# Patient Record
Sex: FEMALE | Race: WHITE | Employment: FULL TIME | URBAN - NONMETROPOLITAN AREA
[De-identification: names, ages, dates, MRNs, and addresses within clinical notes are randomized per-mention and may not be internally consistent; named-entity substitution may affect disease eponyms.]

---

## 2017-03-13 DIAGNOSIS — E89.0 POSTABLATIVE HYPOTHYROIDISM: ICD-10-CM

## 2017-03-13 RX ORDER — LEVOTHYROXINE SODIUM 0.15 MG/1
TABLET ORAL
Qty: 90 TABLET | Refills: 1 | Status: SHIPPED | OUTPATIENT
Start: 2017-03-13 | End: 2017-06-27 | Stop reason: SDUPTHER

## 2017-06-27 DIAGNOSIS — E89.0 POSTABLATIVE HYPOTHYROIDISM: ICD-10-CM

## 2017-06-27 RX ORDER — LEVOTHYROXINE SODIUM 0.15 MG/1
TABLET ORAL
Qty: 90 TABLET | Refills: 1 | Status: SHIPPED | OUTPATIENT
Start: 2017-06-27

## 2019-01-22 ENCOUNTER — HOSPITAL ENCOUNTER (OUTPATIENT)
Dept: MAMMOGRAPHY | Age: 49
Discharge: HOME | End: 2019-01-22
Payer: COMMERCIAL

## 2019-01-22 DIAGNOSIS — Z12.39 BREAST CANCER SCREENING: ICD-10-CM

## 2019-01-22 PROCEDURE — 77067 SCR MAMMO BI INCL CAD: CPT

## 2019-12-07 ASSESSMENT — PAIN DESCRIPTION - ORIENTATION: ORIENTATION: LEFT

## 2019-12-07 ASSESSMENT — PAIN SCALES - GENERAL: PAINLEVEL_OUTOF10: 6

## 2019-12-07 ASSESSMENT — PAIN DESCRIPTION - FREQUENCY: FREQUENCY: CONTINUOUS

## 2019-12-07 ASSESSMENT — PAIN DESCRIPTION - LOCATION: LOCATION: FLANK

## 2019-12-07 ASSESSMENT — PAIN DESCRIPTION - DESCRIPTORS: DESCRIPTORS: ACHING

## 2019-12-07 ASSESSMENT — PAIN DESCRIPTION - PAIN TYPE: TYPE: ACUTE PAIN

## 2019-12-08 ENCOUNTER — HOSPITAL ENCOUNTER (EMERGENCY)
Age: 49
Discharge: HOME | End: 2019-12-08
Payer: COMMERCIAL

## 2019-12-08 ENCOUNTER — APPOINTMENT (OUTPATIENT)
Dept: CT IMAGING | Age: 49
End: 2019-12-08
Payer: COMMERCIAL

## 2019-12-08 VITALS
HEIGHT: 67 IN | WEIGHT: 140 LBS | TEMPERATURE: 97.3 F | HEART RATE: 67 BPM | RESPIRATION RATE: 14 BRPM | OXYGEN SATURATION: 98 % | DIASTOLIC BLOOD PRESSURE: 78 MMHG | BODY MASS INDEX: 21.97 KG/M2 | SYSTOLIC BLOOD PRESSURE: 136 MMHG

## 2019-12-08 DIAGNOSIS — N20.1 URETEROLITHIASIS: ICD-10-CM

## 2019-12-08 DIAGNOSIS — N39.0 URINARY TRACT INFECTION WITHOUT COMPLICATION: ICD-10-CM

## 2019-12-08 DIAGNOSIS — N81.4 UTERINE PROLAPSE: ICD-10-CM

## 2019-12-08 DIAGNOSIS — N20.0 KIDNEY STONE: Primary | ICD-10-CM

## 2019-12-08 LAB
ALBUMIN SERPL-MCNC: 4.1 G/DL (ref 3.5–5.1)
ALP BLD-CCNC: 66 U/L (ref 38–126)
ALT SERPL-CCNC: 16 U/L (ref 11–66)
ANION GAP SERPL CALCULATED.3IONS-SCNC: 9 MEQ/L (ref 8–16)
AST SERPL-CCNC: 24 U/L (ref 5–40)
BACTERIA: ABNORMAL /HPF
BASOPHILS # BLD: 1.4 %
BASOPHILS ABSOLUTE: 0.1 THOU/MM3 (ref 0–0.1)
BILIRUB SERPL-MCNC: 0.2 MG/DL (ref 0.3–1.2)
BILIRUBIN DIRECT: < 0.2 MG/DL (ref 0–0.3)
BILIRUBIN URINE: ABNORMAL
BLOOD, URINE: ABNORMAL
BUN BLDV-MCNC: 22 MG/DL (ref 7–22)
CALCIUM SERPL-MCNC: 10.1 MG/DL (ref 8.5–10.5)
CASTS 2: ABNORMAL /LPF
CASTS UA: ABNORMAL /LPF
CHARACTER, URINE: ABNORMAL
CHLORIDE BLD-SCNC: 109 MEQ/L (ref 98–111)
CO2: 24 MEQ/L (ref 23–33)
COLOR: ABNORMAL
CREAT SERPL-MCNC: 0.8 MG/DL (ref 0.4–1.2)
CRYSTALS, UA: ABNORMAL
EOSINOPHIL # BLD: 10.1 %
EOSINOPHILS ABSOLUTE: 0.8 THOU/MM3 (ref 0–0.4)
EPITHELIAL CELLS, UA: ABNORMAL /HPF
ERYTHROCYTE [DISTWIDTH] IN BLOOD BY AUTOMATED COUNT: 13.9 % (ref 11.5–14.5)
ERYTHROCYTE [DISTWIDTH] IN BLOOD BY AUTOMATED COUNT: 50.2 FL (ref 35–45)
GFR SERPL CREATININE-BSD FRML MDRD: 76 ML/MIN/1.73M2
GLUCOSE BLD-MCNC: 85 MG/DL (ref 70–108)
GLUCOSE URINE: ABNORMAL MG/DL
HCT VFR BLD CALC: 37.5 % (ref 37–47)
HEMOGLOBIN: 11.8 GM/DL (ref 12–16)
ICTOTEST: NEGATIVE
IMMATURE GRANS (ABS): 0.03 THOU/MM3 (ref 0–0.07)
IMMATURE GRANULOCYTES: 0.4 %
KETONES, URINE: ABNORMAL
LEUKOCYTE ESTERASE, URINE: ABNORMAL
LIPASE: 48.4 U/L (ref 5.6–51.3)
LYMPHOCYTES # BLD: 22.3 %
LYMPHOCYTES ABSOLUTE: 1.7 THOU/MM3 (ref 1–4.8)
MAGNESIUM: 1.9 MG/DL (ref 1.6–2.4)
MCH RBC QN AUTO: 31.5 PG (ref 26–33)
MCHC RBC AUTO-ENTMCNC: 31.5 GM/DL (ref 32.2–35.5)
MCV RBC AUTO: 100 FL (ref 81–99)
MISCELLANEOUS 2: ABNORMAL
MONOCYTES # BLD: 6.3 %
MONOCYTES ABSOLUTE: 0.5 THOU/MM3 (ref 0.4–1.3)
MUCUS: ABNORMAL
NITRITE, URINE: ABNORMAL
NUCLEATED RED BLOOD CELLS: 0 /100 WBC
OSMOLALITY CALCULATION: 285.7 MOSMOL/KG (ref 275–300)
PH UA: ABNORMAL (ref 5–9)
PLATELET # BLD: 201 THOU/MM3 (ref 130–400)
PMV BLD AUTO: 10.9 FL (ref 9.4–12.4)
POTASSIUM SERPL-SCNC: 4.2 MEQ/L (ref 3.5–5.2)
PROTEIN UA: ABNORMAL
RBC # BLD: 3.75 MILL/MM3 (ref 4.2–5.4)
RBC URINE: ABNORMAL /HPF
RENAL EPITHELIAL, UA: ABNORMAL
SEG NEUTROPHILS: 59.5 %
SEGMENTED NEUTROPHILS ABSOLUTE COUNT: 4.5 THOU/MM3 (ref 1.8–7.7)
SODIUM BLD-SCNC: 142 MEQ/L (ref 135–145)
SPECIFIC GRAVITY, URINE: ABNORMAL (ref 1–1.03)
TOTAL PROTEIN: 6.6 G/DL (ref 6.1–8)
UROBILINOGEN, URINE: ABNORMAL EU/DL (ref 0–1)
WBC # BLD: 7.6 THOU/MM3 (ref 4.8–10.8)
WBC UA: ABNORMAL /HPF
YEAST: ABNORMAL

## 2019-12-08 PROCEDURE — 36415 COLL VENOUS BLD VENIPUNCTURE: CPT

## 2019-12-08 PROCEDURE — 6360000002 HC RX W HCPCS: Performed by: NURSE PRACTITIONER

## 2019-12-08 PROCEDURE — 6360000004 HC RX CONTRAST MEDICATION: Performed by: NURSE PRACTITIONER

## 2019-12-08 PROCEDURE — 96374 THER/PROPH/DIAG INJ IV PUSH: CPT

## 2019-12-08 PROCEDURE — 83690 ASSAY OF LIPASE: CPT

## 2019-12-08 PROCEDURE — 85025 COMPLETE CBC W/AUTO DIFF WBC: CPT

## 2019-12-08 PROCEDURE — 99284 EMERGENCY DEPT VISIT MOD MDM: CPT

## 2019-12-08 PROCEDURE — 74177 CT ABD & PELVIS W/CONTRAST: CPT

## 2019-12-08 PROCEDURE — 2580000003 HC RX 258: Performed by: NURSE PRACTITIONER

## 2019-12-08 PROCEDURE — 80053 COMPREHEN METABOLIC PANEL: CPT

## 2019-12-08 PROCEDURE — 81001 URINALYSIS AUTO W/SCOPE: CPT

## 2019-12-08 PROCEDURE — 83735 ASSAY OF MAGNESIUM: CPT

## 2019-12-08 PROCEDURE — 82248 BILIRUBIN DIRECT: CPT

## 2019-12-08 RX ORDER — HYDROCODONE BITARTRATE AND ACETAMINOPHEN 5; 325 MG/1; MG/1
1 TABLET ORAL EVERY 6 HOURS PRN
Qty: 12 TABLET | Refills: 0 | Status: SHIPPED | OUTPATIENT
Start: 2019-12-08 | End: 2019-12-11

## 2019-12-08 RX ORDER — SULFAMETHOXAZOLE AND TRIMETHOPRIM 800; 160 MG/1; MG/1
1 TABLET ORAL 2 TIMES DAILY
Qty: 20 TABLET | Refills: 0 | Status: SHIPPED | OUTPATIENT
Start: 2019-12-08 | End: 2019-12-18

## 2019-12-08 RX ORDER — KETOROLAC TROMETHAMINE 30 MG/ML
30 INJECTION, SOLUTION INTRAMUSCULAR; INTRAVENOUS ONCE
Status: COMPLETED | OUTPATIENT
Start: 2019-12-08 | End: 2019-12-08

## 2019-12-08 RX ORDER — 0.9 % SODIUM CHLORIDE 0.9 %
1000 INTRAVENOUS SOLUTION INTRAVENOUS ONCE
Status: COMPLETED | OUTPATIENT
Start: 2019-12-08 | End: 2019-12-08

## 2019-12-08 RX ORDER — TAMSULOSIN HYDROCHLORIDE 0.4 MG/1
0.4 CAPSULE ORAL DAILY
Qty: 30 CAPSULE | Refills: 0 | Status: SHIPPED | OUTPATIENT
Start: 2019-12-08 | End: 2020-12-28

## 2019-12-08 RX ORDER — KETOROLAC TROMETHAMINE 10 MG/1
10 TABLET, FILM COATED ORAL EVERY 6 HOURS PRN
Qty: 20 TABLET | Refills: 0 | Status: SHIPPED | OUTPATIENT
Start: 2019-12-08 | End: 2020-12-28

## 2019-12-08 RX ORDER — ONDANSETRON 4 MG/1
4 TABLET, ORALLY DISINTEGRATING ORAL EVERY 8 HOURS PRN
Qty: 20 TABLET | Refills: 0 | Status: SHIPPED | OUTPATIENT
Start: 2019-12-08 | End: 2020-12-28

## 2019-12-08 RX ADMIN — SODIUM CHLORIDE 1000 ML: 9 INJECTION, SOLUTION INTRAVENOUS at 00:42

## 2019-12-08 RX ADMIN — IOPAMIDOL 85 ML: 755 INJECTION, SOLUTION INTRAVENOUS at 01:00

## 2019-12-08 RX ADMIN — KETOROLAC TROMETHAMINE 30 MG: 30 INJECTION, SOLUTION INTRAMUSCULAR at 02:17

## 2019-12-08 ASSESSMENT — PAIN DESCRIPTION - LOCATION: LOCATION: VAGINA

## 2019-12-08 ASSESSMENT — ENCOUNTER SYMPTOMS
SHORTNESS OF BREATH: 0
DIARRHEA: 0
WHEEZING: 0
CHEST TIGHTNESS: 0
COUGH: 0
NAUSEA: 0
TROUBLE SWALLOWING: 0
SORE THROAT: 0
ABDOMINAL PAIN: 1
CONSTIPATION: 0
RHINORRHEA: 0

## 2019-12-08 ASSESSMENT — PAIN SCALES - GENERAL: PAINLEVEL_OUTOF10: 4

## 2019-12-08 ASSESSMENT — PAIN DESCRIPTION - DESCRIPTORS: DESCRIPTORS: PRESSURE

## 2019-12-08 ASSESSMENT — PAIN DESCRIPTION - PAIN TYPE: TYPE: ACUTE PAIN

## 2020-10-12 ENCOUNTER — TELEPHONE (OUTPATIENT)
Dept: RHEUMATOLOGY | Age: 50
End: 2020-10-12

## 2020-10-12 NOTE — TELEPHONE ENCOUNTER
Patient said that her previous rheumatologist was retiring and referred her to Dr. Hernando Solitario. She said that she was told that they were sending a referral to the office.   Please advise  [phone]

## 2020-11-10 ENCOUNTER — HOSPITAL ENCOUNTER (OUTPATIENT)
Dept: MAMMOGRAPHY | Age: 50
Discharge: HOME | End: 2020-11-10
Payer: COMMERCIAL

## 2020-11-10 PROCEDURE — 77063 BREAST TOMOSYNTHESIS BI: CPT

## 2020-12-28 ENCOUNTER — HOSPITAL ENCOUNTER (OUTPATIENT)
Dept: GENERAL RADIOLOGY | Age: 50
Discharge: HOME | End: 2020-12-28
Payer: COMMERCIAL

## 2020-12-28 ENCOUNTER — HOSPITAL ENCOUNTER (OUTPATIENT)
Age: 50
Discharge: HOME | End: 2020-12-28
Payer: COMMERCIAL

## 2020-12-28 ENCOUNTER — NURSE ONLY (OUTPATIENT)
Dept: LAB | Age: 50
End: 2020-12-28

## 2020-12-28 ENCOUNTER — OFFICE VISIT (OUTPATIENT)
Dept: RHEUMATOLOGY | Age: 50
End: 2020-12-28
Payer: COMMERCIAL

## 2020-12-28 VITALS
WEIGHT: 147.2 LBS | DIASTOLIC BLOOD PRESSURE: 78 MMHG | OXYGEN SATURATION: 98 % | BODY MASS INDEX: 23.1 KG/M2 | HEIGHT: 67 IN | SYSTOLIC BLOOD PRESSURE: 120 MMHG | HEART RATE: 68 BPM

## 2020-12-28 LAB
ALBUMIN SERPL-MCNC: 3.8 G/DL (ref 3.5–5.1)
ALP BLD-CCNC: 67 U/L (ref 38–126)
ALT SERPL-CCNC: 17 U/L (ref 11–66)
ANION GAP SERPL CALCULATED.3IONS-SCNC: 7 MEQ/L (ref 8–16)
AST SERPL-CCNC: 22 U/L (ref 5–40)
BASOPHILS # BLD: 1.4 %
BASOPHILS ABSOLUTE: 0.1 THOU/MM3 (ref 0–0.1)
BILIRUB SERPL-MCNC: 0.2 MG/DL (ref 0.3–1.2)
BUN BLDV-MCNC: 16 MG/DL (ref 7–22)
C-REACTIVE PROTEIN: 0.07 MG/DL (ref 0–1)
CALCIUM SERPL-MCNC: 10.1 MG/DL (ref 8.5–10.5)
CHLORIDE BLD-SCNC: 104 MEQ/L (ref 98–111)
CO2: 26 MEQ/L (ref 23–33)
CREAT SERPL-MCNC: 0.7 MG/DL (ref 0.4–1.2)
EOSINOPHIL # BLD: 4.3 %
EOSINOPHILS ABSOLUTE: 0.2 THOU/MM3 (ref 0–0.4)
ERYTHROCYTE [DISTWIDTH] IN BLOOD BY AUTOMATED COUNT: 13.3 % (ref 11.5–14.5)
ERYTHROCYTE [DISTWIDTH] IN BLOOD BY AUTOMATED COUNT: 45.5 FL (ref 35–45)
GFR SERPL CREATININE-BSD FRML MDRD: 88 ML/MIN/1.73M2
GLUCOSE BLD-MCNC: 93 MG/DL (ref 70–108)
HAV IGM SER IA-ACNC: NEGATIVE
HCT VFR BLD CALC: 38 % (ref 37–47)
HEMOGLOBIN: 11.6 GM/DL (ref 12–16)
HEPATITIS B CORE IGM ANTIBODY: NEGATIVE
HEPATITIS B SURFACE ANTIGEN: NEGATIVE
HEPATITIS C ANTIBODY: NEGATIVE
IMMATURE GRANS (ABS): 0.02 THOU/MM3 (ref 0–0.07)
IMMATURE GRANULOCYTES: 0.4 %
LYMPHOCYTES # BLD: 17.7 %
LYMPHOCYTES ABSOLUTE: 0.9 THOU/MM3 (ref 1–4.8)
MCH RBC QN AUTO: 28.9 PG (ref 26–33)
MCHC RBC AUTO-ENTMCNC: 30.5 GM/DL (ref 32.2–35.5)
MCV RBC AUTO: 94.8 FL (ref 81–99)
MONOCYTES # BLD: 6.8 %
MONOCYTES ABSOLUTE: 0.4 THOU/MM3 (ref 0.4–1.3)
NUCLEATED RED BLOOD CELLS: 0 /100 WBC
PLATELET # BLD: 142 THOU/MM3 (ref 130–400)
PMV BLD AUTO: 13.6 FL (ref 9.4–12.4)
POTASSIUM SERPL-SCNC: 4.5 MEQ/L (ref 3.5–5.2)
RBC # BLD: 4.01 MILL/MM3 (ref 4.2–5.4)
RHEUMATOID FACTOR: 11 IU/ML (ref 0–13)
SEDIMENTATION RATE, ERYTHROCYTE: 8 MM/HR (ref 0–20)
SEG NEUTROPHILS: 69.4 %
SEGMENTED NEUTROPHILS ABSOLUTE COUNT: 3.6 THOU/MM3 (ref 1.8–7.7)
SODIUM BLD-SCNC: 137 MEQ/L (ref 135–145)
TOTAL PROTEIN: 6.5 G/DL (ref 6.1–8)
WBC # BLD: 5.2 THOU/MM3 (ref 4.8–10.8)

## 2020-12-28 PROCEDURE — 73130 X-RAY EXAM OF HAND: CPT

## 2020-12-28 PROCEDURE — 99244 OFF/OP CNSLTJ NEW/EST MOD 40: CPT | Performed by: NURSE PRACTITIONER

## 2020-12-28 PROCEDURE — 71046 X-RAY EXAM CHEST 2 VIEWS: CPT

## 2020-12-28 RX ORDER — FOLIC ACID 1 MG/1
1 TABLET ORAL DAILY
Qty: 90 TABLET | Refills: 1 | Status: SHIPPED | OUTPATIENT
Start: 2020-12-28 | End: 2021-03-25 | Stop reason: SDUPTHER

## 2020-12-28 SDOH — HEALTH STABILITY: MENTAL HEALTH: HOW MANY STANDARD DRINKS CONTAINING ALCOHOL DO YOU HAVE ON A TYPICAL DAY?: NOT ASKED

## 2020-12-28 SDOH — HEALTH STABILITY: MENTAL HEALTH: HOW OFTEN DO YOU HAVE A DRINK CONTAINING ALCOHOL?: NOT ASKED

## 2020-12-28 ASSESSMENT — ENCOUNTER SYMPTOMS
EYE PAIN: 0
NAUSEA: 0
BACK PAIN: 1
COUGH: 0
DIARRHEA: 0
CONSTIPATION: 0
EYE ITCHING: 0
ABDOMINAL PAIN: 0
SHORTNESS OF BREATH: 0
TROUBLE SWALLOWING: 0

## 2020-12-28 NOTE — PROGRESS NOTES
[facility]       Date Of Service: 12/28/2020  Provider: Mickey Fortune CNP    Name: True Lange   MRN: [id number]    Chief Complaint(s)      Chief Complaint   Patient presents with    New Patient     referral from Dr Delbert Anderson for Rheumatoid Arthritis        History of Present illness (HPI)    True Lange   is a(n)50 y.o. female with a hx of depression, hypothyroidism, h/o rheumatoid arthritis, ? Psoriasis, h/o psoriatic arthritis referred by Yoselin Meng MD for evaluation of rheumatoid arthritis. Symptoms started: In 2005 with sudden bilateral hand and wrist pain and swelling which progressed to more widespread joint pains and swelling. Was diagnosed with rheumatoid arthritis by Dr. Chris Montero initially. Was placed on plaquenil, methotrexate, remicaide (less than a year, relief). Once joints pains and swelling improved, remicaide was stopped. Transitioned care to Dr. Delbert Anderson about 8 years ago. Has had eczema since a child, gets patches on knees, elbow, thighs. Thought ? psoriasis, tried otezla with no relief. Has seen dermatology years ago- unsure what final diagnosis was. Uses steroid cream with some relief (Dr. Debbi Wilcox in [facility]). The patient reports pain affecting her hands, wrists, elbows, shoulder, neck, low back, feet    Pain on a scale 0-10: 5/10  Type of pain: stiffness, aching   Timing: mornings and evenings  Aggravating factors:weather changes, increased use of joint. Prolonged inactivity. Back: movement, sitting for too long  Alleviating factors: movement, hot shower  Current therapy: plaquenil 200 mg BID, methotrexate 15 mg PO weekly, naproxen 500 mg BID PRN   Previous therapy: remicaide (relief), humira (hair loss, ? Relief), xeljanz (hair loss, ?  Relief), otezla (no relief)    Associated symptoms:  + swelling/  redness/  Warmth (hands, wrists)  + AM stiffness lasting 45 minutes  + gelling    + rash left elbow, right trunk    + color changes of hands with cold exposure    + hair loss     + intermittent triggering right 3,4, left 4th fingers      -denies Photosenstivity, dry mouth/dry eyes, oral/nasal sores, digital ulcerations, skin tightening, renal disease,foamy urination, hematuria, sz's, blood clots, pre-term labor loss, AIHA,leukpenia/lymphopenia, thrombocytopenia, serositis    - denies enthesitis, dactylitis, nail changes, hx of STD,  personal or family history of Psoriatic arthritis, psoriasis, ank spond    + fmhx of rheumatoid arthritis in paternal grandmother, + eczema in mother and sisters, maternal cousins       Cancer screening: UTD  Travel: n/a  Exposure(s): n/a    Review of Systems    Review of Systems   Constitutional: Positive for fatigue. Negative for fever and unexpected weight change. HENT: Negative for congestion and trouble swallowing. Eyes: Negative for pain and itching. Respiratory: Negative for cough and shortness of breath. Cardiovascular: Negative for chest pain and leg swelling. Gastrointestinal: Negative for abdominal pain, constipation, diarrhea and nausea. Endocrine: Negative for cold intolerance and heat intolerance. Genitourinary: Negative for difficulty urinating, frequency and urgency. Musculoskeletal: Positive for arthralgias, back pain, joint swelling and neck pain. Skin: Positive for rash. Neurological: Positive for weakness (hands). Negative for dizziness, numbness and headaches. Psychiatric/Behavioral: The patient is not nervous/anxious. PAST MEDICAL HISTORY     has a past medical history of Depression, Graves disease, Hypothyroidism, and Rheumatoid arthritis ([address] PAST SURGICAL HISTORY     has a past surgical history that includes Endometrial ablation.      FAMILY HISTORY      Family History   Problem Relation Age of Onset    High Blood Pressure Mother     High Blood Pressure Father     High Cholesterol Father     Cancer Father         colon       SOCIAL HISTORY     reports that color and temp ,  + small dry area right elbow, right trunk  Extremities:  No clubbing ,     Musculoskeletal:   ROM normal,  Strength 5/5     Upper extremities:    SHOULDERS nontender, no swelling ,   ELBOWS nontender, no swelling,   WRISTS nontender, no swelling,   HANDS/FINGERS     MCPs nontender, no swelling    PIPs + tender right 3,4, left 3,4. + hypertrophy of joints   DIPs nontender, no swelling    Lower extremities:  HIPS nontender  KNEES nontender, no swelling  ANKLES nontender, no swelling   FEET : nontender, no swelling     Spine:   C-spine, T-spine & L-spine:  non tender,  ROM  normal ,  - ministerio, - Occiput to wall , SLR/Cross SLR. LABS        CBC  Lab Results   Component Value Date    WBC 5.2 12/28/2020    RBC 4.01 12/28/2020    RBC 3.84 08/04/2016    HGB 11.6 12/28/2020    HCT 38.0 12/28/2020    MCV 94.8 12/28/2020    MCH 28.9 12/28/2020    MCHC 30.5 12/28/2020    RDW 13.8 08/04/2016     12/28/2020       CMP  Lab Results   Component Value Date    CALCIUM 10.1 12/08/2019    LABALBU 4.1 12/08/2019    PROT 6.6 12/08/2019     12/08/2019    K 4.2 12/08/2019    CO2 24 12/08/2019     12/08/2019    BUN 22 12/08/2019    CREATININE 0.8 12/08/2019    ALKPHOS 66 12/08/2019    ALT 16 12/08/2019    AST 24 12/08/2019       HgBA1c: No components found for: HGBA1C    Lab Results   Component Value Date    TSH 1.33 06/25/2020     No results found for: VITD25      No results found for: ANASCRN  No results found for: SSA  No results found for: SSB  No results found for: ANTI-SMITH  No results found for: DSDNAAB   No results found for: ANTIRNP  No results found for: C3, C4  No results found for: CCPAB  No results found for: RF    No components found for: CANCASCRN, APANCASCRN  Lab Results   Component Value Date    SEDRATE 8 12/28/2020     No results found for: CRP    RADIOLOGY:         ASSESSMENT/PLAN    Assessment   Plan   1. H/O rheumatoid arthritis  2.  H/O psoriatic arthritis  - joint pain and swelling bilateral hands and wrists in 2005 which progressed to generalized joint pains and swelling. Diagnosed with rheumatoid arthritis by Dr. Angelia Lujan. Transitioned care to Dr. Angelia Huff about 8 years ago. Has had eczema since a child, but there was a concern of psoriasis at some point and was treated for ? psoriaitc arthritis. Currently taking methotrexate 15 mg PO weekly, plaquenil 200 mg BID, and naproxen 500 mg mg BID. Reports ongoing joint pains in wrists, hands, elbows, shoulders, neck, low, back, and feet. Exam with tenderness PIPs, NO swelling.   - prior tx: remicaide (relief), humira (hair loss, ? Relief), xeljanz (hair loss, ? Relief), otezla (no relief)   - continue plaquenil 200 mg BID   - continue methotrexate 15 mg PO weekly   - start folic acid 1 mg daily- may help with hair loss   - discussed addition of SSZ or arava vs biologic depending upon blood testing and imaging   - CBC Auto Differential; Future  - Comprehensive Metabolic Panel; Future  - C-Reactive Protein; Future  - Sedimentation Rate; Future  - Cyclic Citrul Peptide Antibody, IgG; Future  - Hepatitis Panel, Acute; Future  - Rheumatoid Factor; Future  - XR CHEST (2 VW); Future  - XR HAND LEFT (MIN 3 VIEWS); Future  - XR HAND RIGHT (MIN 3 VIEWS); Future    3. Rash- dry patches right elbow and trunk  - previously diagnosed as eczema. + strong famhx of eczema    4. Chronic bilateral low back pain without sciatica  - low back pain for several years. Worse with activity and sitting for long periods. Prior CT abd/pelvis reviewed with no inflammatory features. 5. Raynaud's disease without gangrene  - reports color changes of the hands    6. Trigger finger, unspecified finger, unspecified laterality  - suspect related to #1    7. Medication monitoring encounter   - CBC, CMP, sed rate, CRP q 12 weeks    - Plaquenil eye exam- request records from Dr. Romy Catherine      No follow-ups on file.     Electronically signed by DANIELA Jackman -

## 2020-12-30 LAB — CYCLIC CITRULLINATED PEPTIDE ANTIBODY IGG: < 1.5 U/ML

## 2021-03-17 ENCOUNTER — PATIENT MESSAGE (OUTPATIENT)
Dept: RHEUMATOLOGY | Age: 51
End: 2021-03-17

## 2021-03-24 ENCOUNTER — PATIENT MESSAGE (OUTPATIENT)
Dept: RHEUMATOLOGY | Age: 51
End: 2021-03-24

## 2021-03-25 RX ORDER — FOLIC ACID 1 MG/1
1 TABLET ORAL DAILY
Qty: 90 TABLET | Refills: 1 | Status: SHIPPED | OUTPATIENT
Start: 2021-03-25 | End: 2021-07-02 | Stop reason: SDUPTHER

## 2021-03-25 NOTE — TELEPHONE ENCOUNTER
From: Viktoriya Andersen  To: Pamela Feldmanyeny APRN - CNP  Sent: 3/24/2021 9:28 PM EDT  Subject: Prescription Question    Can I get refills for the Folic acid please?   Liyah Carver

## 2021-05-06 DIAGNOSIS — Z87.39 H/O RHEUMATOID ARTHRITIS: Primary | ICD-10-CM

## 2021-05-06 RX ORDER — HYDROXYCHLOROQUINE SULFATE 200 MG/1
200 TABLET, FILM COATED ORAL DAILY
Qty: 30 TABLET | Refills: 5 | Status: SHIPPED | OUTPATIENT
Start: 2021-05-06 | End: 2021-06-03 | Stop reason: SDUPTHER

## 2021-06-01 ENCOUNTER — PATIENT MESSAGE (OUTPATIENT)
Dept: RHEUMATOLOGY | Age: 51
End: 2021-06-01

## 2021-06-01 DIAGNOSIS — Z87.39 H/O RHEUMATOID ARTHRITIS: ICD-10-CM

## 2021-06-03 RX ORDER — HYDROXYCHLOROQUINE SULFATE 200 MG/1
200 TABLET, FILM COATED ORAL 2 TIMES DAILY
Qty: 60 TABLET | Refills: 5 | Status: SHIPPED | OUTPATIENT
Start: 2021-06-03 | End: 2021-11-09 | Stop reason: SDUPTHER

## 2021-06-03 NOTE — TELEPHONE ENCOUNTER
From: Viktoriya Andersen  To: Montana Mishra, APRN - CNP  Sent: 6/1/2021 7:44 AM EDT  Subject: Non-Urgent Medical Question    Hi [facility]. The prescription that got sent to [facility], was a 30 day, 1x day. I take 2x a day, so the prescription is only for 2 weeks. I know I need to schedule a follow-up appointment. I see you only take patients until 2pm. I work until [employer], in [employer]. Is there any way we can do a virtual appointment? I'm not having any problems, as of now. Or, I'm on a shut-down 6/28-7/1. I can come in during that time. Thanks.

## 2021-07-02 ENCOUNTER — OFFICE VISIT (OUTPATIENT)
Dept: RHEUMATOLOGY | Age: 51
End: 2021-07-02
Payer: COMMERCIAL

## 2021-07-02 VITALS
BODY MASS INDEX: 23.1 KG/M2 | WEIGHT: 147.2 LBS | HEIGHT: 67 IN | DIASTOLIC BLOOD PRESSURE: 80 MMHG | SYSTOLIC BLOOD PRESSURE: 118 MMHG

## 2021-07-02 DIAGNOSIS — Z51.81 MEDICATION MONITORING ENCOUNTER: ICD-10-CM

## 2021-07-02 DIAGNOSIS — Z87.2 H/O PSORIATIC ARTHRITIS: ICD-10-CM

## 2021-07-02 DIAGNOSIS — Z87.39 H/O RHEUMATOID ARTHRITIS: Primary | ICD-10-CM

## 2021-07-02 DIAGNOSIS — M65.30 TRIGGER FINGER, UNSPECIFIED FINGER, UNSPECIFIED LATERALITY: ICD-10-CM

## 2021-07-02 DIAGNOSIS — I73.00 RAYNAUD'S DISEASE WITHOUT GANGRENE: ICD-10-CM

## 2021-07-02 DIAGNOSIS — G89.29 CHRONIC BILATERAL LOW BACK PAIN WITHOUT SCIATICA: ICD-10-CM

## 2021-07-02 DIAGNOSIS — M54.50 CHRONIC BILATERAL LOW BACK PAIN WITHOUT SCIATICA: ICD-10-CM

## 2021-07-02 DIAGNOSIS — R21 RASH: ICD-10-CM

## 2021-07-02 PROCEDURE — 99214 OFFICE O/P EST MOD 30 MIN: CPT | Performed by: NURSE PRACTITIONER

## 2021-07-02 RX ORDER — FOLIC ACID 1 MG/1
1 TABLET ORAL DAILY
Qty: 90 TABLET | Refills: 1 | Status: SHIPPED | OUTPATIENT
Start: 2021-07-02 | End: 2021-11-09 | Stop reason: SDUPTHER

## 2021-07-02 RX ORDER — NAPROXEN 500 MG/1
500 TABLET ORAL PRN
Qty: 60 TABLET | Refills: 2 | Status: SHIPPED | OUTPATIENT
Start: 2021-07-02 | End: 2021-07-06

## 2021-07-02 ASSESSMENT — ENCOUNTER SYMPTOMS
TROUBLE SWALLOWING: 0
EYE ITCHING: 0
COUGH: 0
SHORTNESS OF BREATH: 0
BACK PAIN: 1
CONSTIPATION: 0
DIARRHEA: 0
NAUSEA: 0
EYE PAIN: 0
ABDOMINAL PAIN: 0

## 2021-07-02 NOTE — PROGRESS NOTES
[facility] RHEUMATOLOGY FOLLOW UP NOTE       Date Of Service: 7/2/2021  Provider: DANIELA Parkinson - CNP    Name: Luke Chand   MRN: [id number]    Chief Complaint(s)     Chief Complaint   Patient presents with    6 Month Follow-Up     Rheumatoid Arthritis         History of Present Illness (HPI)     Luke Chand  is a(n)51 y.o. female with a hx of depression, hypothyroidism, h/o rheumatoid arthritis, ? Psoriasis, h/o psoriatic arthritis here for the f/u evaluation of rheumatoid arthritis. Interval hx:    - no new issues    pain affecting the right knee, neck, back  Pain on a scale 0-10: 3.5/10  Type of pain: stiffness, aching  Timing:mornings and evenings  Aggravating factors:  Weather changes, increased use of joints. Prolonged inactivity. Back: movement, sitting for too long  Alleviating factors: movement, hot shower    Associated symptoms:  denies swelling/  Redness/ warmth, + AM stiffness lasting ~ 5 minutes    + rash elbows      REVIEW OF SYSTEMS: (ROS)    Review of Systems   Constitutional: Negative for fatigue, fever and unexpected weight change. HENT: Negative for congestion and trouble swallowing. Eyes: Negative for pain and itching. Respiratory: Negative for cough and shortness of breath. Cardiovascular: Negative for chest pain and leg swelling. Gastrointestinal: Negative for abdominal pain, constipation, diarrhea and nausea. Endocrine: Negative for cold intolerance and heat intolerance. Genitourinary: Negative for difficulty urinating, frequency and urgency. Musculoskeletal: Positive for arthralgias, back pain and joint swelling. Skin: Positive for rash. Neurological: Negative for dizziness, weakness, numbness and headaches. Psychiatric/Behavioral: The patient is not nervous/anxious.         PAST MEDICAL HISTORY      Past Medical History:   Diagnosis Date    Depression     Graves disease     Hypothyroidism     Rheumatoid arthritis ([facility] [address]        PAST SURGICAL HISTORY      Past Surgical History:   Procedure Laterality Date    ENDOMETRIAL ABLATION         FAMILY HISTORY      Family History   Problem Relation Age of Onset    High Blood Pressure Mother     High Blood Pressure Father     High Cholesterol Father     Cancer Father         colon       SOCIAL HISTORY      Social History     Tobacco History     Smoking Status  Former Smoker    Smokeless Tobacco Use  Never Used          Alcohol History     Alcohol Use Status  Yes Comment  rarely          Drug Use     Drug Use Status  No          Sexual Activity     Sexually Active  Not Asked                ALLERGIES   No Known Allergies    CURRENT MEDICATIONS      Current Outpatient Medications   Medication Sig Dispense Refill    hydroxychloroquine (PLAQUENIL) 200 MG tablet Take 1 tablet by mouth 2 times daily 60 tablet 5    folic acid (FOLVITE) 1 MG tablet Take 1 tablet by mouth daily 90 tablet 1    fluocinonide (LIDEX) 0.05 % cream Apply topically 2 times daily. 60 g 1    naproxen (NAPROSYN) 500 MG tablet Take 1 tablet by mouth as needed for Pain 60 tablet 0    levothyroxine (SYNTHROID) 150 MCG tablet TAKE 1 TABLET DAILY 90 tablet 1    methotrexate (RHEUMATREX) 2.5 MG chemo tablet Take 5 mg by mouth once a week Twice a day,one time per week      buPROPion (WELLBUTRIN XL) 300 MG XL tablet Take 300 mg by mouth every morning.  Multiple Vitamins-Minerals (MULTIVITAMIN PO) Take  by mouth daily.  Probiotic Product (PROBIOTIC PO) Take  by mouth daily. No current facility-administered medications for this visit. PHYSICAL EXAMINATION / OBJECTIVE   Objective:  /80 (Site: Left Upper Arm, Position: Sitting, Cuff Size: Medium Adult)   Ht 5' 7.01\" (1.702 m)   Wt 147 lb 3.2 oz (66.8 kg)   BMI 23.05 kg/m²     Physical Exam  Vitals reviewed. Constitutional:       Appearance: She is well-developed. Cardiovascular:      Rate and Rhythm: Normal rate and regular rhythm.    Pulmonary:      Effort: Pulmonary effort is normal.      Breath sounds: Normal breath sounds. Abdominal:      Palpations: Abdomen is soft. Tenderness: There is no abdominal tenderness. Musculoskeletal:      Cervical back: Normal range of motion and neck supple. Skin:     General: Skin is warm and dry. Findings: Rash (small plaques bilat elbows) present. Neurological:      Mental Status: She is alert and oriented to person, place, and time. Deep Tendon Reflexes: Reflexes are normal and symmetric. Psychiatric:         Thought Content:  Thought content normal.       Upper extremities:    SHOULDERS nontender, no swelling ,   ELBOWS nontender, no swelling,   WRISTS nontender, no swelling   HANDS/FINGERS nontender, no swelling    Lower extremities:  HIPS nontender  KNEES nontender, no swelling  ANKLES nontender, no swelling   FEET : nontender, no swelling       RAPID 3:   7/2/2021 --- RAPID 3: 0 + 3.5 + 0 = 3.5     Remission: <3  Low Disease Activity: <6  Moderate Disease Activity: >=6 and <=12  High Disease Activity: >12     LABS    CBC  Lab Results   Component Value Date    WBC 5.2 12/28/2020    RBC 4.01 12/28/2020    RBC 3.84 08/04/2016    HGB 11.6 12/28/2020    HCT 38.0 12/28/2020    MCV 94.8 12/28/2020    MCH 28.9 12/28/2020    MCHC 30.5 12/28/2020    RDW 13.8 08/04/2016     12/28/2020       CMP  Lab Results   Component Value Date    CALCIUM 10.1 12/28/2020    LABALBU 3.8 12/28/2020    PROT 6.5 12/28/2020     12/28/2020    K 4.5 12/28/2020    CO2 26 12/28/2020     12/28/2020    BUN 16 12/28/2020    CREATININE 0.7 12/28/2020    ALKPHOS 67 12/28/2020    ALT 17 12/28/2020    AST 22 12/28/2020       HgBA1c: No components found for: HGBA1C    No results found for: VITD25      No results found for: ANASCRN  No results found for: SSA  No results found for: SSB  No results found for: ANTI-SMITH  No results found for: DSDNAAB   No results found for: ANTIRNP  No results found for: C3, C4  No results found for: CCPAB  Lab Results   Component Value Date    RF 11 12/28/2020       No components found for: [place] Results   Component Value Date    SEDRATE 8 12/28/2020     Lab Results   Component Value Date    CRP 0.07 12/28/2020       RADIOLOGY:         ASSESSMENT/PLAN    Assessment   Plan     Seronegative rheumatoid arthritis  H/O psoriatic arthritis  - joint pain and swelling bilateral hands and wrists in 2005 which progressed to generalized joint pains and swelling. Diagnosed with rheumatoid arthritis by Dr. Darcy Rock. Transitioned care to Dr. Geovanny Osman about 8 years ago. Has had eczema since a child, but there was a concern of psoriasis at some point and was treated for ? psoriaitc arthritis. Currently taking methotrexate 15 mg PO weekly, plaquenil 200 mg BID, and naproxen 500 mg mg BID. Reports ongoing joint pains in wrists, hands, elbows, shoulders, neck, low, back, and feet. Exam with tenderness PIPs, NO swelling.   - prior tx: remicaide (relief), humira (hair loss, ? Relief), xeljanz (hair loss, ? Relief), otezla (no relief)               - continue plaquenil 200 mg BID               - continue methotrexate 15 mg PO weekly               - folic acid 1 mg daily          Rash- elbows  - previously diagnosed as eczema. + strong famhx of eczema     Chronic bilateral low back pain without sciatica  - low back pain for several years. Worse with activity and sitting for long periods. Prior CT abd/pelvis reviewed with no inflammatory features.      Raynaud's disease without gangrene  - reports color changes of the hands     Trigger finger, unspecified finger, unspecified laterality  - suspect related to #1     Medication monitoring encounter               - CBC, CMP, sed rate, CRP q 12 weeks                 - Plaquenil eye exam (12/7/2020)      No follow-ups on file.         Electronically signed by DANIELA Starr - CNP on 7/2/2021 at 9:07 AM    New Prescriptions    No medications on file Thank you for allowing me to participate in the care of this patient. Please call if there are any questions.

## 2021-07-06 RX ORDER — NAPROXEN 500 MG/1
500 TABLET ORAL 2 TIMES DAILY PRN
Qty: 60 TABLET | Refills: 2
Start: 2021-07-06 | End: 2022-09-07 | Stop reason: SDUPTHER

## 2021-07-06 NOTE — PROGRESS NOTES
Inocencia almanza calling for clarification on her sig.  Per [place] verbal order; sig should say BID prn for pain     Verbal order given regarding above

## 2021-07-07 ENCOUNTER — PATIENT MESSAGE (OUTPATIENT)
Dept: RHEUMATOLOGY | Age: 51
End: 2021-07-07

## 2021-07-08 NOTE — TELEPHONE ENCOUNTER
From: Viktoriya Andersen  To: DANIELA Heath - CNP  Sent: 7/7/2021 2:05 PM EDT  Subject: Prescription Question    Hi, I needed a refill for my Methotrexate as well. 6 1x week. . [address] [facility].  Thanks

## 2021-07-10 LAB
ABSOLUTE BASO #: 0.1 X10E9/L (ref 0–0.2)
ABSOLUTE EOS #: 0.2 X10E9/L (ref 0–0.4)
ABSOLUTE LYMPH #: 1.1 X10E9/L (ref 1–3.5)
ABSOLUTE MONO #: 0.5 X10E9/L (ref 0–0.9)
ABSOLUTE NEUT #: 5.2 X10E9/L (ref 1.5–6.6)
ALBUMIN SERPL-MCNC: 4.2 G/DL (ref 3.2–5.3)
ALK PHOSPHATASE: 75 U/L (ref 39–130)
ALT SERPL-CCNC: 12 U/L (ref 0–31)
ANION GAP SERPL CALCULATED.3IONS-SCNC: 7 MMOL/L (ref 5–15)
AST SERPL-CCNC: 18 U/L (ref 0–41)
BASOPHILS RELATIVE PERCENT: 1.2 %
BILIRUB SERPL-MCNC: 0.6 MG/DL (ref 0.3–1.2)
BUN BLDV-MCNC: 22 MG/DL (ref 5–23)
C-REACTIVE PROTEIN: <0.1 MG/DL (ref 0–0.74)
CALCIUM SERPL-MCNC: 10.3 MG/DL (ref 8.5–10.5)
CHLORIDE BLD-SCNC: 106 MMOL/L (ref 98–109)
CO2: 25 MMOL/L (ref 22–32)
CREAT SERPL-MCNC: 0.98 MG/DL (ref 0.4–1)
EGFR AFRICAN AMERICAN: >60 ML/MIN/1.73SQ.M
EGFR IF NONAFRICAN AMERICAN: 60 ML/MIN/1.73SQ.M
EOSINOPHILS RELATIVE PERCENT: 2.8 %
GLUCOSE: 84 MG/DL (ref 65–99)
HCT VFR BLD CALC: 36.7 % (ref 35–47)
HEMOGLOBIN: 12 G/DL (ref 11.7–15.5)
LYMPHOCYTE %: 15.7 %
MCH RBC QN AUTO: 29 PG (ref 27–34)
MCHC RBC AUTO-ENTMCNC: 32.6 G/DL (ref 32–36)
MCV RBC AUTO: 89 FL (ref 80–100)
MONOCYTES # BLD: 7.2 %
NEUTROPHILS RELATIVE PERCENT: 73.1 %
PDW BLD-RTO: 15.8 % (ref 11.5–15)
PLATELETS: 198 X10E9/L (ref 150–450)
PMV BLD AUTO: 11 FL (ref 7–12)
POTASSIUM SERPL-SCNC: 4 MMOL/L (ref 3.5–5)
RBC: 4.13 X10E12/L (ref 3.8–5.2)
SEDIMENTATION RATE, ERYTHROCYTE: 13 MM/H (ref 0–30)
SODIUM BLD-SCNC: 138 MMOL/L (ref 134–146)
TOTAL PROTEIN: 7 G/DL (ref 6–8)
WBC: 7.2 X10E9/L (ref 4–11)

## 2021-11-09 DIAGNOSIS — Z87.39 H/O RHEUMATOID ARTHRITIS: ICD-10-CM

## 2021-11-09 RX ORDER — FOLIC ACID 1 MG/1
1 TABLET ORAL DAILY
Qty: 90 TABLET | Refills: 1 | Status: SHIPPED | OUTPATIENT
Start: 2021-11-09 | End: 2022-02-23 | Stop reason: SDUPTHER

## 2021-11-09 RX ORDER — HYDROXYCHLOROQUINE SULFATE 200 MG/1
200 TABLET, FILM COATED ORAL 2 TIMES DAILY
Qty: 60 TABLET | Refills: 5 | Status: SHIPPED | OUTPATIENT
Start: 2021-11-09 | End: 2021-11-23 | Stop reason: SDUPTHER

## 2021-11-23 ENCOUNTER — OFFICE VISIT (OUTPATIENT)
Dept: RHEUMATOLOGY | Age: 51
End: 2021-11-23
Payer: COMMERCIAL

## 2021-11-23 VITALS
BODY MASS INDEX: 23.23 KG/M2 | WEIGHT: 148 LBS | HEIGHT: 67 IN | DIASTOLIC BLOOD PRESSURE: 86 MMHG | SYSTOLIC BLOOD PRESSURE: 134 MMHG

## 2021-11-23 DIAGNOSIS — M54.50 CHRONIC BILATERAL LOW BACK PAIN WITHOUT SCIATICA: ICD-10-CM

## 2021-11-23 DIAGNOSIS — M65.30 TRIGGER FINGER, UNSPECIFIED FINGER, UNSPECIFIED LATERALITY: ICD-10-CM

## 2021-11-23 DIAGNOSIS — Z87.39 H/O RHEUMATOID ARTHRITIS: Primary | ICD-10-CM

## 2021-11-23 DIAGNOSIS — R30.0 BURNING WITH URINATION: ICD-10-CM

## 2021-11-23 DIAGNOSIS — G89.29 CHRONIC BILATERAL LOW BACK PAIN WITHOUT SCIATICA: ICD-10-CM

## 2021-11-23 DIAGNOSIS — R21 RASH: ICD-10-CM

## 2021-11-23 DIAGNOSIS — Z51.81 MEDICATION MONITORING ENCOUNTER: ICD-10-CM

## 2021-11-23 DIAGNOSIS — I73.00 RAYNAUD'S DISEASE WITHOUT GANGRENE: ICD-10-CM

## 2021-11-23 PROCEDURE — 99214 OFFICE O/P EST MOD 30 MIN: CPT | Performed by: NURSE PRACTITIONER

## 2021-11-23 RX ORDER — HYDROXYCHLOROQUINE SULFATE 200 MG/1
300 TABLET, FILM COATED ORAL DAILY
Qty: 135 TABLET | Refills: 1 | Status: SHIPPED | OUTPATIENT
Start: 2021-11-23 | End: 2022-02-23 | Stop reason: SDUPTHER

## 2021-11-23 ASSESSMENT — ENCOUNTER SYMPTOMS
CONSTIPATION: 0
ABDOMINAL PAIN: 0
BACK PAIN: 1
SHORTNESS OF BREATH: 0
NAUSEA: 0
TROUBLE SWALLOWING: 0
COUGH: 0
EYE PAIN: 0
EYE ITCHING: 0
DIARRHEA: 0

## 2021-11-23 NOTE — PROGRESS NOTES
[facility] RHEUMATOLOGY FOLLOW UP NOTE       Date Of Service: 11/23/2021  Provider: DANIELA Smart - CNP    Name: Pascual Samaniego   MRN: [id number]    Chief Complaint(s)     Chief Complaint   Patient presents with    Medication Refill     Methotrexate, patient wants Plaquenil to be a 90 day supply        History of Present Illness (HPI)     Pascual Samaniego  is a(n)51 y.o. female with a hx of depression, hypothyroidism, h/o rheumatoid arthritis, ? Psoriasis, h/o psoriatic arthritis here for the f/u evaluation of rheumatoid arthritis. Interval hx:    - some burning on urination, thinks she might have UTI    pain affecting the fingers, wrists, right shoulder, right knee, right ankle, right toes, neck, back  Pain on a scale 0-10: 3/10  Type of pain: stiffness, aching  Timing:mornings and evenings  Aggravating factors:  Weather changes, increased use of joints. Prolonged inactivity. Back: movement, sitting for too long  Alleviating factors: movement, hot shower    Associated symptoms:  denies swelling/  Redness/ warmth, + AM stiffness lasting ~60 mins    + rash elbows- intermittent      REVIEW OF SYSTEMS: (ROS)    Review of Systems   Constitutional: Negative for fatigue, fever and unexpected weight change. HENT: Positive for tinnitus. Negative for congestion and trouble swallowing. Eyes: Negative for pain and itching. Respiratory: Negative for cough and shortness of breath. Cardiovascular: Negative for chest pain and leg swelling. Gastrointestinal: Negative for abdominal pain, constipation, diarrhea and nausea. Endocrine: Negative for cold intolerance and heat intolerance. Genitourinary: Positive for dysuria. Negative for difficulty urinating, frequency and urgency. Musculoskeletal: Positive for arthralgias, back pain and neck pain. Negative for joint swelling. Skin: Positive for rash. Neurological: Negative for dizziness, weakness, numbness and headaches.    Psychiatric/Behavioral: The patient is not nervous/anxious. PAST MEDICAL HISTORY      Past Medical History:   Diagnosis Date    Depression     Graves disease     Hypothyroidism     Rheumatoid arthritis ([address]        PAST SURGICAL HISTORY      Past Surgical History:   Procedure Laterality Date    ENDOMETRIAL ABLATION         FAMILY HISTORY      Family History   Problem Relation Age of Onset    High Blood Pressure Mother     High Blood Pressure Father     High Cholesterol Father     Cancer Father         colon       SOCIAL HISTORY      Social History     Tobacco History     Smoking Status  Former Smoker    Smokeless Tobacco Use  Never Used          Alcohol History     Alcohol Use Status  Yes Comment  rarely          Drug Use     Drug Use Status  No          Sexual Activity     Sexually Active  Not Asked                ALLERGIES   No Known Allergies    CURRENT MEDICATIONS      Current Outpatient Medications   Medication Sig Dispense Refill    folic acid (FOLVITE) 1 MG tablet Take 1 tablet by mouth daily 90 tablet 1    hydroxychloroquine (PLAQUENIL) 200 MG tablet Take 1 tablet by mouth 2 times daily 60 tablet 5    methotrexate (RHEUMATREX) 2.5 MG chemo tablet Take 2 tablets by mouth once a week Twice a day,one time per week 24 tablet 0    naproxen (NAPROSYN) 500 MG tablet Take 1 tablet by mouth 2 times daily as needed for Pain 60 tablet 2    fluocinonide (LIDEX) 0.05 % cream Apply topically 2 times daily. 60 g 1    levothyroxine (SYNTHROID) 150 MCG tablet TAKE 1 TABLET DAILY 90 tablet 1    buPROPion (WELLBUTRIN XL) 300 MG XL tablet Take 300 mg by mouth every morning.  Probiotic Product (PROBIOTIC PO) Take  by mouth daily.  Multiple Vitamins-Minerals (MULTIVITAMIN PO) Take  by mouth daily. (Patient not taking: Reported on 11/23/2021)       No current facility-administered medications for this visit.        PHYSICAL EXAMINATION / OBJECTIVE   Objective:  Ht 5' 7\" (1.702 m)   Wt 148 lb (67.1 kg)   BMI 23.18 kg/m² Physical Exam  Vitals reviewed. Constitutional:       Appearance: She is well-developed. Cardiovascular:      Rate and Rhythm: Normal rate and regular rhythm. Pulmonary:      Effort: Pulmonary effort is normal.      Breath sounds: Normal breath sounds. Abdominal:      Palpations: Abdomen is soft. Tenderness: There is no abdominal tenderness. Musculoskeletal:      Cervical back: Normal range of motion and neck supple. Skin:     General: Skin is warm and dry. Findings: Rash (small plaques right elbow) present. Neurological:      Mental Status: She is alert and oriented to person, place, and time. Deep Tendon Reflexes: Reflexes are normal and symmetric. Psychiatric:         Thought Content:  Thought content normal.       Upper extremities:    SHOULDERS nontender, no swelling ,   ELBOWS nontender, no swelling,   WRISTS nontender, no swelling   HANDS/FINGERS tender bilat PIPs, no swelling    Lower extremities:  HIPS nontender  KNEES nontender, no swelling  ANKLES nontender, no swelling   FEET : nontender, no swelling       RAPID 3:   11/23/2021 --- RAPID 3: 0 + 3.5 + 0 = 3.5     Remission: <3  Low Disease Activity: <6  Moderate Disease Activity: >=6 and <=12  High Disease Activity: >12     LABS    CBC  Lab Results   Component Value Date    WBC 7.2 07/09/2021    WBC 5.2 12/28/2020    RBC 4.13 07/09/2021    HGB 12.0 07/09/2021    HCT 36.7 07/09/2021    MCV 89 07/09/2021    MCH 29.0 07/09/2021    MCHC 32.6 07/09/2021    RDW 15.8 07/09/2021     07/09/2021     12/28/2020       CMP  Lab Results   Component Value Date    CALCIUM 10.3 07/09/2021    LABALBU 4.2 07/09/2021    PROT 7.0 07/09/2021     07/09/2021    K 4.0 07/09/2021    CO2 25 07/09/2021     07/09/2021    BUN 22 07/09/2021    CREATININE 0.98 07/09/2021    ALKPHOS 75 07/09/2021    ALKPHOS 67 12/28/2020    ALT 12 07/09/2021    AST 18 07/09/2021       HgBA1c: No components found for: HGBA1C    No results found for: VITD25      No results found for: ANASCRN  No results found for: SSA  No results found for: SSB  No results found for: ANTI-SMITH  No results found for: DSDNAAB   No results found for: ANTIRNP  No results found for: C3, C4  No results found for: CCPAB  Lab Results   Component Value Date    RF 11 12/28/2020       No components found for: CANCASCRN, APANCASCRN  Lab Results   Component Value Date    SEDRATE 13 07/09/2021     Lab Results   Component Value Date    CRP <0.1 07/09/2021       RADIOLOGY:         ASSESSMENT/PLAN    Assessment   Plan     Seronegative rheumatoid arthritis  H/O psoriatic arthritis  - joint pain and swelling bilateral hands and wrists in 2005 which progressed to generalized joint pains and swelling. Diagnosed with rheumatoid arthritis by Dr. Ramesh Ambriz. Transitioned care to Dr. Dany Aguirre about 8 years ago. Has had eczema since a child, but there was a concern of psoriasis at some point and was treated for ? psoriaitc arthritis. Currently taking methotrexate 15 mg PO weekly, plaquenil 200 mg BID, and naproxen 500 mg mg BID. Reports ongoing joint pains in wrists, hands, elbows, shoulders, neck, low, back, and feet. Exam with tenderness PIPs, NO swelling.   - prior tx: remicaide (relief), humira (hair loss, ? Relief), xeljanz (hair loss, ? Relief), otezla (no relief)               - decrease plaquenil to 300 mg daily for wt based dosing               - methotrexate 15 mg PO weekly               - folic acid 1 mg daily          Rash- elbows, intermittent  - previously diagnosed as eczema. + strong famhx of eczema     Chronic bilateral low back pain without sciatica  - low back pain for several years. Worse with activity and sitting for long periods.  Prior CT abd/pelvis reviewed with no inflammatory features.      Raynaud's disease without gangrene  - reports color changes of the hands     Trigger finger, unspecified finger, unspecified laterality  - suspect related to #1     Medication monitoring encounter               - CBC, CMP, sed rate, CRP q 12 weeks  - due now               - Plaquenil eye exam (12/7/2020)    Burning on urination   - U/a ordered    No follow-ups on file. Electronically signed by DANIELA Rivero CNP on 11/23/2021 at 9:34 AM    New Prescriptions    No medications on file       Thank you for allowing me to participate in the care of this patient. Please call if there are any questions.

## 2021-11-24 LAB
ABSOLUTE BASO #: 0.1 X10E9/L (ref 0–0.2)
ABSOLUTE EOS #: 0.3 X10E9/L (ref 0–0.4)
ABSOLUTE LYMPH #: 1.4 X10E9/L (ref 1–3.5)
ABSOLUTE MONO #: 0.5 X10E9/L (ref 0–0.9)
ABSOLUTE NEUT #: 4.1 X10E9/L (ref 1.5–6.6)
ALBUMIN SERPL-MCNC: 4.3 G/DL (ref 3.2–5.3)
ALK PHOSPHATASE: 82 U/L (ref 39–130)
ALT SERPL-CCNC: 9 U/L (ref 0–31)
ANION GAP SERPL CALCULATED.3IONS-SCNC: 7 MMOL/L (ref 5–15)
AST SERPL-CCNC: 16 U/L (ref 0–41)
BASOPHILS RELATIVE PERCENT: 1.4 %
BILIRUB SERPL-MCNC: 0.5 MG/DL (ref 0.3–1.2)
BUN BLDV-MCNC: 19 MG/DL (ref 5–23)
C-REACTIVE PROTEIN: <0.1 MG/DL (ref 0–0.74)
CALCIUM SERPL-MCNC: 10.3 MG/DL (ref 8.5–10.5)
CHLORIDE BLD-SCNC: 107 MMOL/L (ref 98–109)
CO2: 28 MMOL/L (ref 22–32)
CREAT SERPL-MCNC: 0.92 MG/DL (ref 0.4–1)
EGFR AFRICAN AMERICAN: >60 ML/MIN/1.73SQ.M
EGFR IF NONAFRICAN AMERICAN: >60 ML/MIN/1.73SQ.M
EOSINOPHILS RELATIVE PERCENT: 4.4 %
GLUCOSE: 96 MG/DL (ref 65–99)
HCT VFR BLD CALC: 37.9 % (ref 35–47)
HEMOGLOBIN: 12.5 G/DL (ref 11.7–15.5)
LYMPHOCYTE %: 21.8 %
MCH RBC QN AUTO: 29.8 PG (ref 27–34)
MCHC RBC AUTO-ENTMCNC: 33.1 G/DL (ref 32–36)
MCV RBC AUTO: 90 FL (ref 80–100)
MONOCYTES # BLD: 7.4 %
NEUTROPHILS RELATIVE PERCENT: 65 %
PDW BLD-RTO: 14.8 % (ref 11.5–15)
PLATELETS: 208 X10E9/L (ref 150–450)
PMV BLD AUTO: 11.2 FL (ref 7–12)
POTASSIUM SERPL-SCNC: 3.7 MMOL/L (ref 3.5–5)
RBC: 4.2 X10E12/L (ref 3.8–5.2)
SEDIMENTATION RATE, ERYTHROCYTE: 10 MM/H (ref 0–30)
SODIUM BLD-SCNC: 142 MMOL/L (ref 134–146)
TOTAL PROTEIN: 7 G/DL (ref 6–8)
WBC: 6.4 X10E9/L (ref 4–11)

## 2022-01-11 ENCOUNTER — HOSPITAL ENCOUNTER (OUTPATIENT)
Dept: MAMMOGRAPHY | Age: 52
Discharge: HOME | End: 2022-01-11
Payer: COMMERCIAL

## 2022-01-11 DIAGNOSIS — Z12.31 VISIT FOR SCREENING MAMMOGRAM: ICD-10-CM

## 2022-01-11 PROCEDURE — 77063 BREAST TOMOSYNTHESIS BI: CPT

## 2022-01-17 ENCOUNTER — HOSPITAL ENCOUNTER (OUTPATIENT)
Dept: WOMENS IMAGING | Age: 52
Discharge: HOME | End: 2022-01-17
Payer: COMMERCIAL

## 2022-01-17 DIAGNOSIS — R92.1 BREAST CALCIFICATIONS: ICD-10-CM

## 2022-01-17 PROCEDURE — G0279 TOMOSYNTHESIS, MAMMO: HCPCS

## 2022-02-23 ENCOUNTER — OFFICE VISIT (OUTPATIENT)
Dept: RHEUMATOLOGY | Age: 52
End: 2022-02-23
Payer: COMMERCIAL

## 2022-02-23 VITALS
DIASTOLIC BLOOD PRESSURE: 80 MMHG | WEIGHT: 147 LBS | SYSTOLIC BLOOD PRESSURE: 122 MMHG | OXYGEN SATURATION: 95 % | BODY MASS INDEX: 23.07 KG/M2 | HEART RATE: 76 BPM | HEIGHT: 67 IN

## 2022-02-23 DIAGNOSIS — M54.50 CHRONIC BILATERAL LOW BACK PAIN WITHOUT SCIATICA: ICD-10-CM

## 2022-02-23 DIAGNOSIS — Z87.39 H/O RHEUMATOID ARTHRITIS: Primary | ICD-10-CM

## 2022-02-23 DIAGNOSIS — Z87.2 H/O PSORIATIC ARTHRITIS: ICD-10-CM

## 2022-02-23 DIAGNOSIS — Z51.81 MEDICATION MONITORING ENCOUNTER: ICD-10-CM

## 2022-02-23 DIAGNOSIS — G89.29 CHRONIC BILATERAL LOW BACK PAIN WITHOUT SCIATICA: ICD-10-CM

## 2022-02-23 DIAGNOSIS — I73.00 RAYNAUD'S DISEASE WITHOUT GANGRENE: ICD-10-CM

## 2022-02-23 DIAGNOSIS — R21 RASH: ICD-10-CM

## 2022-02-23 DIAGNOSIS — M65.30 TRIGGER FINGER, UNSPECIFIED FINGER, UNSPECIFIED LATERALITY: ICD-10-CM

## 2022-02-23 PROCEDURE — 99214 OFFICE O/P EST MOD 30 MIN: CPT | Performed by: NURSE PRACTITIONER

## 2022-02-23 RX ORDER — HYDROXYCHLOROQUINE SULFATE 200 MG/1
200 TABLET, FILM COATED ORAL 2 TIMES DAILY
Qty: 180 TABLET | Refills: 1 | Status: SHIPPED | OUTPATIENT
Start: 2022-02-23 | End: 2022-05-23

## 2022-02-23 RX ORDER — FOLIC ACID 1 MG/1
1 TABLET ORAL DAILY
Qty: 90 TABLET | Refills: 1 | Status: SHIPPED | OUTPATIENT
Start: 2022-02-23 | End: 2022-07-05 | Stop reason: SDUPTHER

## 2022-02-23 ASSESSMENT — ENCOUNTER SYMPTOMS
COUGH: 0
CONSTIPATION: 0
TROUBLE SWALLOWING: 0
ABDOMINAL PAIN: 0
DIARRHEA: 0
BACK PAIN: 0
NAUSEA: 0
EYE PAIN: 0
SHORTNESS OF BREATH: 0
EYE ITCHING: 0

## 2022-02-23 NOTE — PROGRESS NOTES
[facility] RHEUMATOLOGY FOLLOW UP NOTE       Date Of Service: 2/23/2022  Provider: DANIELA Cobian - CNP    Name: Ame Mora   MRN: [id number]    Chief Complaint(s)     Chief Complaint   Patient presents with    Follow-up        History of Present Illness (HPI)     Ame Mora  is a(n)51 y.o. female with a hx of depression, hypothyroidism, h/o rheumatoid arthritis, ? Psoriasis, h/o psoriatic arthritis here for the f/u evaluation of rheumatoid arthritis. Interval hx:    - no new issues    pain affecting the fingers, wrists, elbows, shoulders, hips, knees, ankles, toes, neck, back  Pain on a scale 0-10: 3/10  Type of pain: stiffness, aching  Timing:mornings and evenings  Aggravating factors:  Weather changes, increased use of joints. Prolonged inactivity. Back: movement, sitting for too long  Alleviating factors: movement, hot shower    Associated symptoms:  denies swelling/  Redness/ warmth, + AM stiffness lasting ~30 mins    + rash elbows- intermittent      REVIEW OF SYSTEMS: (ROS)    Review of Systems   Constitutional: Negative for fatigue, fever and unexpected weight change. HENT: Positive for congestion and tinnitus. Negative for trouble swallowing. Eyes: Negative for pain and itching. Respiratory: Negative for cough and shortness of breath. Cardiovascular: Negative for chest pain and leg swelling. Gastrointestinal: Negative for abdominal pain, constipation, diarrhea and nausea. Endocrine: Negative for cold intolerance and heat intolerance. Genitourinary: Negative for difficulty urinating, dysuria, frequency and urgency. Musculoskeletal: Positive for arthralgias. Negative for back pain, joint swelling and neck pain. Skin: Positive for rash. Neurological: Negative for dizziness, weakness, numbness and headaches. Psychiatric/Behavioral: The patient is not nervous/anxious.         PAST MEDICAL HISTORY      Past Medical History:   Diagnosis Date    Depression     Graves disease     Hypothyroidism     Rheumatoid arthritis ([facility] [address]        PAST SURGICAL HISTORY      Past Surgical History:   Procedure Laterality Date    BREAST BIOPSY Left 2009    not real sure of the year , results were negative    ENDOMETRIAL ABLATION         FAMILY HISTORY      Family History   Problem Relation Age of Onset    High Blood Pressure Mother     High Blood Pressure Father     High Cholesterol Father     Colon Cancer Father 76    Ovarian Cancer Maternal Aunt 72       SOCIAL HISTORY      Social History     Tobacco History     Smoking Status  Former Smoker    Smokeless Tobacco Use  Never Used          Alcohol History     Alcohol Use Status  Yes Comment  rarely          Drug Use     Drug Use Status  No          Sexual Activity     Sexually Active  Not Asked                ALLERGIES   No Known Allergies    CURRENT MEDICATIONS      Current Outpatient Medications   Medication Sig Dispense Refill    hydroxychloroquine (PLAQUENIL) 200 MG tablet Take 1.5 tablets by mouth daily 920 tablet 1    folic acid (FOLVITE) 1 MG tablet Take 1 tablet by mouth daily 90 tablet 1    naproxen (NAPROSYN) 500 MG tablet Take 1 tablet by mouth 2 times daily as needed for Pain 60 tablet 2    levothyroxine (SYNTHROID) 150 MCG tablet TAKE 1 TABLET DAILY 90 tablet 1    buPROPion (WELLBUTRIN XL) 300 MG XL tablet Take 300 mg by mouth every morning.  Multiple Vitamins-Minerals (MULTIVITAMIN PO) Take by mouth daily       Probiotic Product (PROBIOTIC PO) Take  by mouth daily.  fluocinonide (LIDEX) 0.05 % cream Apply topically 2 times daily. 60 g 1     No current facility-administered medications for this visit. PHYSICAL EXAMINATION / OBJECTIVE   Objective:  /80   Pulse 76   Ht 5' 7\" (1.702 m)   Wt 147 lb (66.7 kg)   SpO2 95%   BMI 23.02 kg/m²     Physical Exam  Vitals reviewed. Constitutional:       Appearance: She is well-developed.    Cardiovascular:      Rate and Rhythm: Normal rate and regular rhythm. Pulmonary:      Effort: Pulmonary effort is normal.      Breath sounds: Normal breath sounds. Musculoskeletal:      Cervical back: Normal range of motion and neck supple. Skin:     General: Skin is warm and dry. Findings: Rash (small plaques right elbow) present. Neurological:      Mental Status: She is alert and oriented to person, place, and time. Deep Tendon Reflexes: Reflexes are normal and symmetric. Psychiatric:         Thought Content:  Thought content normal.       Upper extremities:    SHOULDERS nontender, no swelling ,   ELBOWS tender right, no swelling  WRISTS nontender, no swelling   HANDS/FINGERS nontender, no swelling    Lower extremities:  HIPS nontender  KNEES nontender, no swelling  ANKLES nontender, no swelling   FEET : nontender, no swelling       RAPID 3:   2/23/2022 --- RAPID 3: 0 + 3.5 + 0 = 3.5     Remission: <3  Low Disease Activity: <6  Moderate Disease Activity: >=6 and <=12  High Disease Activity: >12     LABS    CBC  Lab Results   Component Value Date    WBC 6.4 11/23/2021    WBC 5.2 12/28/2020    RBC 4.20 11/23/2021    HGB 12.5 11/23/2021    HCT 37.9 11/23/2021    MCV 90 11/23/2021    MCH 29.8 11/23/2021    MCHC 33.1 11/23/2021    RDW 14.8 11/23/2021     11/23/2021     12/28/2020       CMP  Lab Results   Component Value Date    CALCIUM 10.3 11/23/2021    LABALBU 4.3 11/23/2021    PROT 7.0 11/23/2021     11/23/2021    K 3.7 11/23/2021    CO2 28 11/23/2021     11/23/2021    BUN 19 11/23/2021    CREATININE 0.92 11/23/2021    ALKPHOS 82 11/23/2021    ALKPHOS 67 12/28/2020    ALT 9 11/23/2021    AST 16 11/23/2021       HgBA1c: No components found for: HGBA1C    No results found for: VITD25      No results found for: ANASCRN  No results found for: SSA  No results found for: SSB  No results found for: ANTI-SMITH  No results found for: DSDNAAB   No results found for: ANTIRNP  No results found for: C3, C4  No results found for: CCPAB  Lab Results   Component Value Date    RF 11 12/28/2020       No components found for: Akiko Zepeda Results   Component Value Date    SEDRATE 10 11/23/2021     Lab Results   Component Value Date    CRP <0.1 11/23/2021       RADIOLOGY:         ASSESSMENT/PLAN    Assessment   Plan     Seronegative rheumatoid arthritis  H/O psoriatic arthritis  - joint pain and swelling bilateral hands and wrists in 2005 which progressed to generalized joint pains and swelling. Diagnosed with rheumatoid arthritis by Dr. Jessica Ball. Transitioned care to Dr. Erlin Huertas about 8 years ago. Has had eczema since a child, but there was a concern of psoriasis at some point and was treated for ? psoriaitc arthritis. Currently taking methotrexate 15 mg PO weekly, plaquenil 200 mg BID, and naproxen 500 mg mg BID. Reports ongoing joint pains in wrists, hands, elbows, shoulders, neck, low, back, and feet. Exam with tenderness PIPs, NO swelling.   - prior tx: remicaide (relief), humira (hair loss, ? Relief), xeljanz (hair loss, ? Relief), otezla (no relief)               - plaquenil 400 mg BID               - methotrexate 15 mg PO weekly & folic acid 1 mg daily          Rash- elbows, intermittently active  - previously diagnosed as eczema. + strong famhx of eczema     Chronic bilateral low back pain without sciatica  - low back pain for several years. Worse with activity and sitting for long periods. Prior CT abd/pelvis reviewed with no inflammatory features.      Raynaud's disease without gangrene  - reports color changes of the hands     Trigger finger, unspecified finger, unspecified laterality- resolved     Medication monitoring encounter               - CBC, CMP, sed rate, CRP q 12 weeks  - due now               - Plaquenil eye exam (12/7/2020)    No follow-ups on file.         Electronically signed by DANIELA Nieves - CNP on 2/23/2022 at 3:15 PM    New Prescriptions    No medications on file       Thank you for allowing me to participate in the care of this patient. Please call if there are any questions.

## 2022-02-24 LAB
ALBUMIN SERPL-MCNC: 3.9 G/DL (ref 3.2–5.3)
ALK PHOSPHATASE: 94 U/L (ref 39–130)
ALT SERPL-CCNC: 18 U/L (ref 0–31)
ANION GAP SERPL CALCULATED.3IONS-SCNC: 5 MMOL/L (ref 5–15)
AST SERPL-CCNC: 23 U/L (ref 0–41)
BANDS PRESENT: 1 %
BASOPHILS # BLD: 1 %
BASOPHILS ABSOLUTE: 0.1 X10E9/L (ref 0–0.2)
BILIRUB SERPL-MCNC: 0.3 MG/DL (ref 0.3–1.2)
BUN BLDV-MCNC: 26 MG/DL (ref 5–23)
C-REACTIVE PROTEIN: 0.3 MG/DL (ref 0–0.74)
CALCIUM SERPL-MCNC: 10.4 MG/DL (ref 8.5–10.5)
CHLORIDE BLD-SCNC: 107 MMOL/L (ref 98–109)
CO2: 30 MMOL/L (ref 22–32)
CREAT SERPL-MCNC: 0.77 MG/DL (ref 0.4–1)
EGFR AFRICAN AMERICAN: >60 ML/MIN/1.73SQ.M
EGFR IF NONAFRICAN AMERICAN: >60 ML/MIN/1.73SQ.M
EOSINOPHIL # BLD: 11.4 %
EOSINOPHILS ABSOLUTE: 0.6 X10E9/L (ref 0–0.4)
GLUCOSE: 75 MG/DL (ref 65–99)
HCT VFR BLD CALC: 34.7 % (ref 35–47)
HEMOGLOBIN: 11.2 G/DL (ref 11.7–15.5)
LYMPHOCYTES # BLD: 21.9 %
LYMPHOCYTES ABSOLUTE: 1.1 X10E9/L (ref 1–3.5)
MCH RBC QN AUTO: 29.2 PG (ref 27–34)
MCHC RBC AUTO-ENTMCNC: 32.2 G/DL (ref 32–36)
MCV RBC AUTO: 91 FL (ref 80–100)
METAMYELOCYTES: 1 %
MONOCYTES # BLD: 2.9 %
MONOCYTES ABSOLUTE: 0.1 X10E9/L (ref 0–0.9)
NEUTROPHILS ABSOLUTE: 3.2 X10E9/L (ref 1.5–6.6)
NEUTROPHILS RELATIVE PERCENT: 60.8 %
PDW BLD-RTO: 15.8 % (ref 11.5–15)
PLATELETS: 193 X10E9/L (ref 150–450)
PMV BLD AUTO: 9.7 FL (ref 7–12)
POTASSIUM SERPL-SCNC: 4.3 MMOL/L (ref 3.5–5)
RBC # BLD: ABNORMAL 10*6/UL
RBC: 3.84 X10E12/L (ref 3.8–5.2)
SEDIMENTATION RATE, ERYTHROCYTE: 38 MM/H (ref 0–30)
SODIUM BLD-SCNC: 142 MMOL/L (ref 134–146)
TOTAL PROTEIN: 6.8 G/DL (ref 6–8)
WBC: 5.1 X10E9/L (ref 4–11)

## 2022-05-11 RX ORDER — METHOTREXATE 2.5 MG/1
TABLET ORAL
Qty: 72 TABLET | Refills: 0 | OUTPATIENT
Start: 2022-05-11

## 2022-05-17 LAB
ABSOLUTE BASO #: 0.1 X10E9/L (ref 0–0.2)
ABSOLUTE EOS #: 0.3 X10E9/L (ref 0–0.4)
ABSOLUTE LYMPH #: 1.4 X10E9/L (ref 1–3.5)
ABSOLUTE MONO #: 0.4 X10E9/L (ref 0–0.9)
ABSOLUTE NEUT #: 4.8 X10E9/L (ref 1.5–6.6)
ALBUMIN SERPL-MCNC: 4.2 G/DL (ref 3.2–5.3)
ALK PHOSPHATASE: 73 U/L (ref 39–130)
ALT SERPL-CCNC: 9 U/L (ref 0–31)
ANION GAP SERPL CALCULATED.3IONS-SCNC: 8 MMOL/L (ref 5–15)
AST SERPL-CCNC: 18 U/L (ref 0–41)
BASOPHILS RELATIVE PERCENT: 1.7 %
BILIRUB SERPL-MCNC: 0.4 MG/DL (ref 0.3–1.2)
BUN BLDV-MCNC: 18 MG/DL (ref 5–23)
C-REACTIVE PROTEIN: <0.1 MG/DL (ref 0–0.74)
CALCIUM SERPL-MCNC: 10.3 MG/DL (ref 8.5–10.5)
CHLORIDE BLD-SCNC: 109 MMOL/L (ref 98–109)
CO2: 23 MMOL/L (ref 22–32)
CREAT SERPL-MCNC: 0.98 MG/DL (ref 0.4–1)
EGFR AFRICAN AMERICAN: >60 ML/MIN/1.73SQ.M
EGFR IF NONAFRICAN AMERICAN: 59 ML/MIN/1.73SQ.M
EOSINOPHILS RELATIVE PERCENT: 4.9 %
GLUCOSE: 87 MG/DL (ref 65–99)
HCT VFR BLD CALC: 37.9 % (ref 35–47)
HEMOGLOBIN: 12.4 G/DL (ref 11.7–15.5)
LYMPHOCYTE %: 19.8 %
MCH RBC QN AUTO: 30.3 PG (ref 27–34)
MCHC RBC AUTO-ENTMCNC: 32.8 G/DL (ref 32–36)
MCV RBC AUTO: 92 FL (ref 80–100)
MONOCYTES # BLD: 5.2 %
NEUTROPHILS RELATIVE PERCENT: 68.4 %
PDW BLD-RTO: 14.2 % (ref 11.5–15)
PLATELETS: 193 X10E9/L (ref 150–450)
PMV BLD AUTO: 10.6 FL (ref 7–12)
POTASSIUM SERPL-SCNC: 3.9 MMOL/L (ref 3.5–5)
RBC: 4.11 X10E12/L (ref 3.8–5.2)
SEDIMENTATION RATE, ERYTHROCYTE: 14 MM/H (ref 0–30)
SODIUM BLD-SCNC: 140 MMOL/L (ref 134–146)
TOTAL PROTEIN: 6.9 G/DL (ref 6–8)
WBC: 7.1 X10E9/L (ref 4–11)

## 2022-05-22 DIAGNOSIS — Z87.39 H/O RHEUMATOID ARTHRITIS: ICD-10-CM

## 2022-05-23 RX ORDER — HYDROXYCHLOROQUINE SULFATE 200 MG/1
TABLET, FILM COATED ORAL
Qty: 135 TABLET | Refills: 2 | Status: SHIPPED | OUTPATIENT
Start: 2022-05-23 | End: 2022-07-05 | Stop reason: SDUPTHER

## 2022-05-25 RX ORDER — METHOTREXATE 2.5 MG/1
TABLET ORAL
Qty: 72 TABLET | Refills: 0 | OUTPATIENT
Start: 2022-05-25

## 2022-07-05 ENCOUNTER — OFFICE VISIT (OUTPATIENT)
Dept: RHEUMATOLOGY | Age: 52
End: 2022-07-05
Payer: COMMERCIAL

## 2022-07-05 VITALS
OXYGEN SATURATION: 99 % | SYSTOLIC BLOOD PRESSURE: 128 MMHG | HEIGHT: 67 IN | HEART RATE: 66 BPM | WEIGHT: 148.6 LBS | BODY MASS INDEX: 23.32 KG/M2 | DIASTOLIC BLOOD PRESSURE: 80 MMHG

## 2022-07-05 DIAGNOSIS — G89.29 CHRONIC BILATERAL LOW BACK PAIN WITHOUT SCIATICA: ICD-10-CM

## 2022-07-05 DIAGNOSIS — Z87.39 H/O RHEUMATOID ARTHRITIS: Primary | ICD-10-CM

## 2022-07-05 DIAGNOSIS — R21 RASH: ICD-10-CM

## 2022-07-05 DIAGNOSIS — I73.00 RAYNAUD'S DISEASE WITHOUT GANGRENE: ICD-10-CM

## 2022-07-05 DIAGNOSIS — M54.50 CHRONIC BILATERAL LOW BACK PAIN WITHOUT SCIATICA: ICD-10-CM

## 2022-07-05 DIAGNOSIS — Z51.81 MEDICATION MONITORING ENCOUNTER: ICD-10-CM

## 2022-07-05 PROCEDURE — 99214 OFFICE O/P EST MOD 30 MIN: CPT | Performed by: INTERNAL MEDICINE

## 2022-07-05 RX ORDER — FOLIC ACID 1 MG/1
1 TABLET ORAL DAILY
Qty: 90 TABLET | Refills: 1 | Status: SHIPPED | OUTPATIENT
Start: 2022-07-05

## 2022-07-05 RX ORDER — HYDROXYCHLOROQUINE SULFATE 200 MG/1
TABLET, FILM COATED ORAL
Qty: 135 TABLET | Refills: 2 | Status: SHIPPED | OUTPATIENT
Start: 2022-07-05

## 2022-07-05 ASSESSMENT — ENCOUNTER SYMPTOMS
NAUSEA: 0
BACK PAIN: 0
TROUBLE SWALLOWING: 0
EYE ITCHING: 0
EYE PAIN: 0
COUGH: 0
DIARRHEA: 0
CONSTIPATION: 0
ABDOMINAL PAIN: 0
SHORTNESS OF BREATH: 0

## 2022-07-05 NOTE — PROGRESS NOTES
[facility] RHEUMATOLOGY FOLLOW UP NOTE       Date Of Service: 7/5/2022  Provider: Tay Kapadia DO    Name: Berny Eisenberg   MRN: [id number]    Chief Complaint(s)     Chief Complaint   Patient presents with    Follow-up     6 month follow up        History of Present Illness (HPI)     Berny Eisenberg  is a(n)52 y.o. female with a hx of depression, hypothyroidism, h/o rheumatoid arthritis, ? Psoriasis, h/o psoriatic arthritis here for the f/u evaluation ? Seronegative vs psoriasiatic arthritis      Episodes of increased pain and reported mild swelling of the pip joints since the last evaluation. pain affecting the fingers, neck, lower back and right knee - intermittent,  Localized pain up to 3/10, greatest in the mornings. Aggravating factors:  Weather changes, increased use of joints. Prolonged inactivity. Back: movement, sitting for too long    Alleviating factors: movement, hot shower. + AM stiffness lasting ~30 mins    + rash elbows- active bilat. Raynauds active bilateral feet. Denies digital ulcerations, sores. REVIEW OF SYSTEMS: (ROS)    Review of Systems   Constitutional: Negative for fatigue, fever and unexpected weight change. HENT: Positive for congestion and tinnitus. Negative for trouble swallowing. Eyes: Negative for pain and itching. Respiratory: Negative for cough and shortness of breath. Cardiovascular: Positive for leg swelling. Negative for chest pain. Gastrointestinal: Negative for abdominal pain, constipation, diarrhea and nausea. Endocrine: Negative for cold intolerance and heat intolerance. Genitourinary: Negative for difficulty urinating, dysuria, frequency and urgency. Musculoskeletal: Negative for arthralgias, back pain, joint swelling and neck pain. Skin: Positive for rash. Neurological: Negative for dizziness, weakness, numbness and headaches. Psychiatric/Behavioral: The patient is not nervous/anxious.         PAST MEDICAL HISTORY      Past Medical History:   Diagnosis Date    Depression     Graves disease     Hypothyroidism     Rheumatoid arthritis ([facility] [address]        PAST SURGICAL HISTORY      Past Surgical History:   Procedure Laterality Date    BREAST BIOPSY Left 2009    not real sure of the year , results were negative    ENDOMETRIAL ABLATION         FAMILY HISTORY      Family History   Problem Relation Age of Onset    High Blood Pressure Mother     High Blood Pressure Father     High Cholesterol Father     Colon Cancer Father 76    Ovarian Cancer Maternal Aunt 72       SOCIAL HISTORY      Social History     Tobacco History     Smoking Status  Former Smoker    Smokeless Tobacco Use  Never Used          Alcohol History     Alcohol Use Status  Yes Comment  rarely          Drug Use     Drug Use Status  No          Sexual Activity     Sexually Active  Not Asked                ALLERGIES   No Known Allergies    CURRENT MEDICATIONS      Current Outpatient Medications   Medication Sig Dispense Refill    hydroxychloroquine (PLAQUENIL) 200 MG tablet TAKE 1 AND 1/2 TABLET BY MOUTH DAILY 135 tablet 2    methotrexate (RHEUMATREX) 2.5 MG chemo tablet Take 6 tablets by mouth once a week 72 tablet 0    folic acid (FOLVITE) 1 MG tablet Take 1 tablet by mouth daily 90 tablet 1    naproxen (NAPROSYN) 500 MG tablet Take 1 tablet by mouth 2 times daily as needed for Pain 60 tablet 2    levothyroxine (SYNTHROID) 150 MCG tablet TAKE 1 TABLET DAILY 90 tablet 1    buPROPion (WELLBUTRIN XL) 300 MG XL tablet Take 300 mg by mouth every morning.  Multiple Vitamins-Minerals (MULTIVITAMIN PO) Take by mouth daily       Probiotic Product (PROBIOTIC PO) Take  by mouth daily.  fluocinonide (LIDEX) 0.05 % cream Apply topically 2 times daily. 60 g 1     No current facility-administered medications for this visit.        PHYSICAL EXAMINATION / OBJECTIVE   Objective:  /80 (Site: Left Upper Arm, Position: Sitting, Cuff Size: Large Adult)   Pulse 66   Ht 5' 7.01\" (1.702 m)   Wt 148 lb 9.6 oz (67.4 kg)   SpO2 99%   BMI 23.27 kg/m²     Physical Exam  Vitals reviewed. Constitutional:       Appearance: She is well-developed. Cardiovascular:      Rate and Rhythm: Normal rate and regular rhythm. Pulmonary:      Effort: Pulmonary effort is normal.      Breath sounds: Normal breath sounds. Musculoskeletal:      Cervical back: Normal range of motion and neck supple. Skin:     General: Skin is warm and dry. Findings: Rash (small plaques right elbow) present. Neurological:      Mental Status: She is alert and oriented to person, place, and time. Deep Tendon Reflexes: Reflexes are normal and symmetric. Psychiatric:         Thought Content: Thought content normal.       Upper extremities:    Shoulders, wrists, elbows, fingers - Non-tender, No swelling . + chaka nodes bilat.      Lower extremities:  Hips, knees, ankles, toes - Non-tender, No swelling     Spine:tender and hypertonic traps bilat       RAPID 3:   7/5/2022 --- RAPID 3: 0+ 3+ 0 = 3      Remission: <3  Low Disease Activity: <6  Moderate Disease Activity: >=6 and <=12  High Disease Activity: >12     LABS    CBC  Lab Results   Component Value Date/Time    WBC 7.1 05/16/2022 04:30 PM    WBC 5.2 12/28/2020 09:33 AM    RBC 4.11 05/16/2022 04:30 PM    HGB 12.4 05/16/2022 04:30 PM    HCT 37.9 05/16/2022 04:30 PM    MCV 92 05/16/2022 04:30 PM    MCH 30.3 05/16/2022 04:30 PM    MCHC 32.8 05/16/2022 04:30 PM    RDW 14.2 05/16/2022 04:30 PM     05/16/2022 04:30 PM     12/28/2020 09:33 AM       CMP  Lab Results   Component Value Date/Time    CALCIUM 10.3 05/16/2022 04:30 PM    LABALBU 4.2 05/16/2022 04:30 PM    PROT 6.9 05/16/2022 04:30 PM     05/16/2022 04:30 PM    K 3.9 05/16/2022 04:30 PM    CO2 23 05/16/2022 04:30 PM     05/16/2022 04:30 PM    BUN 18 05/16/2022 04:30 PM    CREATININE 0.98 05/16/2022 04:30 PM    ALKPHOS 73 05/16/2022 04:30 PM    ALKPHOS 67 12/28/2020 09:33 AM ALT 9 05/16/2022 04:30 PM    AST 18 05/16/2022 04:30 PM       Lab Results   Component Value Date    RF 11 12/28/2020         Lab Results   Component Value Date    SEDRATE 14 05/16/2022     Lab Results   Component Value Date    CRP <0.1 05/16/2022       RADIOLOGY:         ASSESSMENT/PLAN    Assessment   Plan     Seronegative rheumatoid arthritis vs H/O psoriatic arthritis  - joint pain and swelling bilateral hands and wrists in 2005 which progressed to generalized joint pains and swelling. Diagnosed with rheumatoid arthritis by Dr. Ivon Dixon. Transitioned care to Dr. Pako Sims about 8 years ago. Has had eczema since a child, but there was a concern of psoriasis at some point and was treated for ? psoriaitc arthritis. Currently taking methotrexate 15 mg PO weekly, plaquenil 200 mg BID, and naproxen 500 mg mg BID. Reports ongoing joint pains in wrists, hands, elbows, shoulders, neck, low, back, and feet. Exam with tenderness PIPs, NO swelling.   - prior tx: remicaide (relief), humira (hair loss, ? Relief), xeljanz (hair loss, ? Relief), otezla (no relief)               - plaquenil 400 mg BID               - increase  methotrexate 25 mg PO weekly & folic acid 1 mg daily --- bc continued mild flares           Rash- elbows, intermittently active   - previously diagnosed as eczema. + strong famhx of eczema     Chronic bilateral low back pain without sciatica  -- low back pain for several years. Worse with activity and sitting for long periods. -- Prior CT abd/pelvis reviewed with no inflammatory features.      Raynaud's disease without gangrene   - reports color changes of the bilat feet       Medication monitoring encounter -- may 2022 labs reviewed with the patient                - CBC, CMP, sed rate, CRP q 12 weeks                 - Plaquenil eye exam (April 2022)     No follow-ups on file.         Electronically signed by Gladys oDwd DO on 7/5/2022 at 8:10 AM    New Prescriptions    No medications on file Thank you for allowing me to participate in the care of this patient. Please call if there are any questions.

## 2022-08-07 DIAGNOSIS — Z87.39 H/O RHEUMATOID ARTHRITIS: ICD-10-CM

## 2022-08-08 RX ORDER — METHOTREXATE 2.5 MG/1
TABLET ORAL
Qty: 40 TABLET | Refills: 0 | OUTPATIENT
Start: 2022-08-08

## 2022-08-30 LAB — SEDIMENTATION RATE, ERYTHROCYTE: 13 MM/HR (ref 0–20)

## 2022-08-31 DIAGNOSIS — Z87.39 H/O RHEUMATOID ARTHRITIS: ICD-10-CM

## 2022-08-31 LAB
ABSOLUTE BASO #: 0.08 K/UL (ref 0–0.2)
ABSOLUTE EOS #: 0.36 K/UL (ref 0–0.5)
ABSOLUTE LYMPH #: 1.29 K/UL (ref 1–4)
ABSOLUTE MONO #: 0.59 K/UL (ref 0.2–1)
ABSOLUTE NEUT #: 3.98 K/UL (ref 1.5–7.5)
ALBUMIN SERPL-MCNC: 4.5 G/DL (ref 3.5–5.2)
ALK PHOSPHATASE: 88 U/L (ref 40–133)
ALT SERPL-CCNC: 14 U/L (ref 5–40)
ANION GAP SERPL CALCULATED.3IONS-SCNC: 8 MEQ/L (ref 7–16)
AST SERPL-CCNC: 22 U/L (ref 9–40)
BASOPHILS RELATIVE PERCENT: 1.3 %
BILIRUB SERPL-MCNC: 0.3 MG/DL
BUN BLDV-MCNC: 22 MG/DL (ref 6–20)
C-REACTIVE PROTEIN: <3 G/DL
CALCIUM SERPL-MCNC: 10.5 MG/DL (ref 8.5–10.5)
CHLORIDE BLD-SCNC: 106 MEQ/L (ref 95–107)
CO2: 25 MEQ/L (ref 19–31)
CREAT SERPL-MCNC: 0.84 MG/DL (ref 0.6–1.3)
EGFR IF NONAFRICAN AMERICAN: 83 ML/MIN/1.73
EOSINOPHILS RELATIVE PERCENT: 5.7 %
GLUCOSE: 74 MG/DL (ref 70–99)
HCT VFR BLD CALC: 35.9 % (ref 34–45)
HEMOGLOBIN: 11.9 G/DL (ref 11.5–15.5)
LYMPHOCYTE %: 20.4 %
MCH RBC QN AUTO: 30 PG (ref 25–33)
MCHC RBC AUTO-ENTMCNC: 33.1 G/DL (ref 31–36)
MCV RBC AUTO: 90.4 FL (ref 80–99)
MONOCYTES # BLD: 9.3 %
NEUTROPHILS RELATIVE PERCENT: 63 %
PDW BLD-RTO: 13.8 % (ref 11.5–15)
PLATELETS: 196 K/UL (ref 130–400)
PMV BLD AUTO: 11.9 FL (ref 9.3–13)
POTASSIUM SERPL-SCNC: 4.5 MEQ/L (ref 3.5–5.4)
RBC: 3.97 M/UL (ref 3.8–5.4)
SODIUM BLD-SCNC: 139 MEQ/L (ref 133–146)
TOTAL PROTEIN: 6.5 G/DL (ref 6.1–8.3)
WBC: 6.3 K/UL (ref 3.5–11)

## 2022-09-08 RX ORDER — NAPROXEN 500 MG/1
500 TABLET ORAL 2 TIMES DAILY PRN
Qty: 60 TABLET | Refills: 2 | Status: SHIPPED | OUTPATIENT
Start: 2022-09-08

## 2022-12-13 NOTE — TELEPHONE ENCOUNTER
DOLV: 02/23/2022  DONV: 07/05/2022  LAST LAB DRAW: 05/16/2022  LAST TB TEST: N/A    Lab Results   Component Value Date     05/16/2022    K 3.9 05/16/2022     05/16/2022    CO2 23 05/16/2022    BUN 18 05/16/2022    CREATININE 0.98 05/16/2022    GLUCOSE 87 05/16/2022    CALCIUM 10.3 05/16/2022    PROT 6.9 05/16/2022    LABALBU 4.2 05/16/2022    BILITOT 0.4 05/16/2022    ALKPHOS 73 05/16/2022    AST 18 05/16/2022    ALT 9 05/16/2022    LABGLOM 88 (A) 12/28/2020       Recent Labs     05/16/22  1630   WBC 7.1   HGB 12.4   HCT 37.9   MCV 92          Lab Results   Component Value Date    SEDRATE 14 05/16/2022       Lab Results   Component Value Date    CRP <0.1 05/16/2022 Individual Psychotherapy (PhD/LCSW)    12/13/2022    [facility]    Therapeutic Intervention: Met with patient.  Outpatient - Insight oriented psychotherapy 45 min - CPT code 65534, Outpatient - Behavior modifying psychotherapy 45 min - CPT code 28766, Outpatient - Supportive psychotherapy 45 min - CPT Code 48492, and Outpatient - Interactive psychotherapy 45 min - CPT code 70799    Chief complaint/reason for encounter: depression and anxiety     Interval history and content of current session: Pt is a 26 yo single female who presents today for f/u visit with LCSW. Pt initially established psychotherapy in order to address feelings of anxiety and depression. Pt currently established with JAYLIN Morales and is prescribed Prozac.      Pt presents in person. Pt states she suffered a miscarriage the week of thanksgiving and has been grieving. She note this is her 3rd miscarriage in 2 years. Pt boyfriend also lost his job as a . Pt has jem suffering from grief. Pt has had minimal depression. She has had a consistent level of anxiety. She notes when she became pregnant she stopped her Prozac and did not restart it. She does not want to restart.  She notes some lack of interests in things at work, however is engaging in hygiene, responsibilities,  and activities of daily living.  Continued to review ways to combat negative automatic thoughts and to challenge self criticism.  Reviewed anxiety reduction techniques.  Engaged in active discussion through constructive processing, support, feedback, and re-framing. Pt fully engaged .Pt states is receptive. Pt to return as scheduled.       Treatment plan:  Target symptoms: depression, anxiety   Why chosen therapy is appropriate versus another modality: relevant to diagnosis, patient responds to this modality, evidence based practice  Outcome monitoring methods: self-report, observation  Therapeutic intervention type: insight oriented  psychotherapy, behavior modifying psychotherapy, supportive psychotherapy, interactive psychotherapy    Risk parameters:  Patient reports no suicidal ideation  Patient reports no homicidal ideation  Patient reports no self-injurious behavior  Patient reports no violent behavior    Verbal deficits: None    Patient's response to intervention:  The patient's response to intervention is accepting.    Progress toward goals and other mental status changes:  The patient's progress toward goals is good.    Diagnosis:     ICD-10-CM ICD-9-CM   1. Grief associated with loss of fetus  F43.21 309.0   2. Generalized anxiety disorder  F41.1 300.02   3. MDD (major depressive disorder), recurrent episode, mild  F33.0 296.31   4. Stressful life events affecting family and household  Z63.79 V61.09       Plan:  individual psychotherapy    Return to clinic: as scheduled    Length of Service (minutes): 45

## 2022-12-20 DIAGNOSIS — Z87.39 H/O RHEUMATOID ARTHRITIS: ICD-10-CM

## 2022-12-20 DIAGNOSIS — Z51.81 MEDICATION MONITORING ENCOUNTER: Primary | ICD-10-CM

## 2022-12-20 RX ORDER — METHOTREXATE 2.5 MG/1
TABLET ORAL
Qty: 40 TABLET | Refills: 2 | OUTPATIENT
Start: 2022-12-20

## 2022-12-29 ENCOUNTER — OFFICE VISIT (OUTPATIENT)
Dept: RHEUMATOLOGY | Age: 52
End: 2022-12-29
Payer: COMMERCIAL

## 2022-12-29 VITALS
BODY MASS INDEX: 23.23 KG/M2 | TEMPERATURE: 72 F | WEIGHT: 148 LBS | SYSTOLIC BLOOD PRESSURE: 128 MMHG | HEIGHT: 67 IN | DIASTOLIC BLOOD PRESSURE: 80 MMHG | OXYGEN SATURATION: 98 %

## 2022-12-29 DIAGNOSIS — Z87.39 H/O RHEUMATOID ARTHRITIS: Primary | ICD-10-CM

## 2022-12-29 DIAGNOSIS — G89.29 CHRONIC PAIN OF RIGHT KNEE: ICD-10-CM

## 2022-12-29 DIAGNOSIS — M65.30 TRIGGER FINGER, UNSPECIFIED FINGER, UNSPECIFIED LATERALITY: ICD-10-CM

## 2022-12-29 DIAGNOSIS — M54.50 CHRONIC BILATERAL LOW BACK PAIN WITHOUT SCIATICA: ICD-10-CM

## 2022-12-29 DIAGNOSIS — M25.561 CHRONIC PAIN OF RIGHT KNEE: ICD-10-CM

## 2022-12-29 DIAGNOSIS — R21 RASH: ICD-10-CM

## 2022-12-29 DIAGNOSIS — Z51.81 MEDICATION MONITORING ENCOUNTER: ICD-10-CM

## 2022-12-29 DIAGNOSIS — I73.00 RAYNAUD'S DISEASE WITHOUT GANGRENE: ICD-10-CM

## 2022-12-29 DIAGNOSIS — G89.29 CHRONIC BILATERAL LOW BACK PAIN WITHOUT SCIATICA: ICD-10-CM

## 2022-12-29 PROCEDURE — 99214 OFFICE O/P EST MOD 30 MIN: CPT | Performed by: INTERNAL MEDICINE

## 2022-12-29 ASSESSMENT — ENCOUNTER SYMPTOMS
GASTROINTESTINAL NEGATIVE: 1
EYES NEGATIVE: 1
RESPIRATORY NEGATIVE: 1

## 2022-12-29 NOTE — PROGRESS NOTES
[facility] RHEUMATOLOGY FOLLOW UP NOTE       Date Of Service: 12/29/2022  Provider: Jeffery Soto DO    Name: Lauren Morgan   MRN: [id number]    Chief Complaint(s)     Chief Complaint   Patient presents with    Follow-up     5 month f/u H/O rheumatoid arthritis    Rt side of body, knee, foot, hip ect         History of Present Illness (HPI)     Lauren Morgan  is a(n)52 y.o. female with a hx of depression, hypothyroidism, h/o rheumatoid arthritis, ? Psoriasis, h/o psoriatic arthritis here for the f/u evaluation ? Seronegative vs psoriasiatic arthritis    Tolerating Methotrexate 25mg q wk  Arthralga - Right knee , Right foot, right ankle, intermittent, localized aching/throbbing. Pain 1 to 3.5/10. timing: evenings. Aggravating factors:  Weather changes. Knee getting up from seated positiong, wt bearing   Alleviating factors: movement, hot shower. + AM stiffness lasting ~ 5 mintues. + gelling of the right knee. Rash elbows- intermittently occurring. Raynauds bilateral feet. Improved w/ no recent color changes. REVIEW OF SYSTEMS: (ROS)    Review of Systems   Constitutional: Negative. HENT:  Positive for tinnitus. Eyes: Negative. Respiratory: Negative. Cardiovascular: Negative. Gastrointestinal: Negative. Endocrine: Negative. Genitourinary: Negative. Skin: Negative. Neurological: Negative. Hematological: Negative. PmHx:  has a past medical history of Depression, Graves disease, Hypothyroidism, and Rheumatoid arthritis ([address] Social History:  reports that she has quit smoking. She has never used smokeless tobacco. She reports current alcohol use. She reports that she does not use drugs.     No Known Allergies    CURRENT MEDICATIONS      Current Outpatient Medications:     methotrexate (RHEUMATREX) 2.5 MG chemo tablet, Take 10 mg by mouth once a week, Disp: , Rfl:     naproxen (NAPROSYN) 500 MG tablet, Take 1 tablet by mouth 2 times daily as needed for Pain, Disp: 60 tablet, Rfl: 2    hydroxychloroquine (PLAQUENIL) 200 MG tablet, TAKE 1 AND 1/2 TABLET BY MOUTH DAILY, Disp: 135 tablet, Rfl: 2    folic acid (FOLVITE) 1 MG tablet, Take 1 tablet by mouth daily, Disp: 90 tablet, Rfl: 1    levothyroxine (SYNTHROID) 150 MCG tablet, TAKE 1 TABLET DAILY, Disp: 90 tablet, Rfl: 1    buPROPion (WELLBUTRIN XL) 300 MG XL tablet, Take 300 mg by mouth every morning., Disp: , Rfl:     Multiple Vitamins-Minerals (MULTIVITAMIN PO), Take by mouth daily , Disp: , Rfl:     Probiotic Product (PROBIOTIC PO), Take  by mouth daily. , Disp: , Rfl:     fluocinonide (LIDEX) 0.05 % cream, Apply topically 2 times daily. , Disp: 60 g, Rfl: 1    PHYSICAL EXAMINATION / OBJECTIVE   Objective:  /80 (Site: Right Upper Arm, Position: Sitting, Cuff Size: Medium Adult)   Temp (!) 72 °F (22.2 °C)   Ht 5' 7.01\" (1.702 m)   Wt 148 lb (67.1 kg)   SpO2 98%   BMI 23.17 kg/m²     Physical Exam  Vitals reviewed. Constitutional:       Appearance: She is well-developed. Cardiovascular:      Rate and Rhythm: Normal rate and regular rhythm. Pulmonary:      Effort: Pulmonary effort is normal.      Breath sounds: Normal breath sounds. Musculoskeletal:      Cervical back: Normal range of motion and neck supple. Skin:     General: Skin is warm and dry. Findings: Rash (mild hypopigmentation of bl elbows) present. Comments: Cyanosis / redness bilat feet   Neurological:      Mental Status: She is alert and oriented to person, place, and time. Deep Tendon Reflexes: Reflexes are normal and symmetric. Psychiatric:         Thought Content: Thought content normal.     Upper extremities:    Shoulders, wrists, elbows, fingers - Non-tender, No swelling . + chaka nodes bilat.      Lower extremities:  Hips, knees, ankles, toes - Non-tender, No swelling      LABS    CBC  Lab Results   Component Value Date/Time    WBC 6.3 08/30/2022 04:25 PM    WBC 5.2 12/28/2020 09:33 AM    RBC 3.97 08/30/2022 04:25 PM    HGB 11.9 08/30/2022 04:25 PM    HCT 35.9 08/30/2022 04:25 PM    MCV 90.4 08/30/2022 04:25 PM    MCH 30.0 08/30/2022 04:25 PM    MCHC 33.1 08/30/2022 04:25 PM    RDW 13.8 08/30/2022 04:25 PM     08/30/2022 04:25 PM     12/28/2020 09:33 AM       CMP  Lab Results   Component Value Date/Time    CALCIUM 10.5 08/30/2022 04:25 PM    LABALBU 4.5 08/30/2022 04:25 PM    PROT 6.5 08/30/2022 04:25 PM     08/30/2022 04:25 PM    K 4.5 08/30/2022 04:25 PM    CO2 25 08/30/2022 04:25 PM     08/30/2022 04:25 PM    BUN 22 08/30/2022 04:25 PM    CREATININE 0.84 08/30/2022 04:25 PM    ALKPHOS 88 08/30/2022 04:25 PM    ALKPHOS 67 12/28/2020 09:33 AM    ALT 14 08/30/2022 04:25 PM    AST 22 08/30/2022 04:25 PM       Lab Results   Component Value Date    RF 11 12/28/2020         Lab Results   Component Value Date    SEDRATE 13 08/30/2022     Lab Results   Component Value Date    CRP <3 08/30/2022       RADIOLOGY:         ASSESSMENT/PLAN    Assessment   Plan     Seronegative rheumatoid arthritis vs H/O psoriatic arthritis  - joint pain and swelling bilateral hands and wrists in 2005 which progressed to generalized joint pains and swelling. Diagnosed with rheumatoid arthritis by Dr. Jena Schneider. Transitioned care to Dr. Claudette Toscano about 8 years ago. Has had eczema since a child, but there was a concern of psoriasis at some point and was treated for ? psoriaitc arthritis. Exam with tenderness PIPs, NO swelling.   --- prior tx: remicaide (relief), humira (hair loss, ? Relief), xeljanz (hair loss, ? Relief), otezla (no relief)               - plaquenil 400 mg BID               - methotrexate 25 mg PO weekly & folic acid 1 mg daily  (increased aug 2022 to 25mg)    - naproxen 500mg twice daily prn. Rash-  - no active lesions    - previously diagnosed as eczema. + strong famhx of eczema    Right knee pain - localized, intermittent, -- x-ray  ordered  ?  Djd vs patellofemoral arthritis / maltracking     Chronic bilateral low back pain without sciatica  -- low back pain for several years. Worse with activity and sitting for long periods. -- Prior CT abd/pelvis reviewed with no inflammatory features. Raynaud's disease without gangrene   - reports color changes of the bilat feet     Long term urse of NSAID    Medication monitoring encounter -- may 2022 labs reviewed with the patient                - CBC, CMP, sed rate, CRP q 12 weeks                 - Plaquenil eye exam (April 2022)     No follow-ups on file. Electronically signed by Pixelle Press, DO on 12/29/2022 at 1:36 PM    New Prescriptions    No medications on file       Thank you for allowing me to participate in the care of this patient. Please call if there are any questions.

## 2023-01-03 LAB
ALBUMIN SERPL-MCNC: 4.5 G/DL
ALP BLD-CCNC: 93 U/L
ALT SERPL-CCNC: 54 U/L
ANION GAP SERPL CALCULATED.3IONS-SCNC: 9 MMOL/L
AST SERPL-CCNC: 35 U/L
BASOPHILS ABSOLUTE: 0.08 /ΜL
BASOPHILS RELATIVE PERCENT: 1.5 %
BILIRUB SERPL-MCNC: 0.3 MG/DL (ref 0.1–1.4)
BUN BLDV-MCNC: 23 MG/DL
C-REACTIVE PROTEIN: 0.3
CALCIUM SERPL-MCNC: 11 MG/DL
CHLORIDE BLD-SCNC: 104 MMOL/L
CO2: 25 MMOL/L
CREAT SERPL-MCNC: 0.88 MG/DL
EOSINOPHILS ABSOLUTE: 0.37 /ΜL
EOSINOPHILS RELATIVE PERCENT: 6.7 %
GFR CALCULATED: 79
GLUCOSE BLD-MCNC: 76 MG/DL
HCT VFR BLD CALC: 34.5 % (ref 36–46)
HEMOGLOBIN: 11.4 G/DL (ref 12–16)
LYMPHOCYTES ABSOLUTE: 1.19 /ΜL
LYMPHOCYTES RELATIVE PERCENT: 21.6 %
MCH RBC QN AUTO: 30.8 PG
MCHC RBC AUTO-ENTMCNC: 33 G/DL
MCV RBC AUTO: 93.2 FL
MONOCYTES ABSOLUTE: 0.61 /ΜL
MONOCYTES RELATIVE PERCENT: 11.1 %
NEUTROPHILS ABSOLUTE: 3.23 /ΜL
NEUTROPHILS RELATIVE PERCENT: 58.7 %
PDW BLD-RTO: 12.9 %
PLATELET # BLD: 206 K/ΜL
PMV BLD AUTO: 12 FL
POTASSIUM SERPL-SCNC: 4.2 MMOL/L
RBC # BLD: 3.7 10^6/ΜL
SEDIMENTATION RATE, ERYTHROCYTE: 10
SODIUM BLD-SCNC: 138 MMOL/L
TOTAL PROTEIN: 6.5
WBC # BLD: 5.5 10^3/ML

## 2023-01-04 DIAGNOSIS — Z87.39 H/O RHEUMATOID ARTHRITIS: ICD-10-CM

## 2023-01-04 RX ORDER — FOLIC ACID 1 MG/1
1 TABLET ORAL DAILY
Qty: 90 TABLET | Refills: 1 | Status: SHIPPED | OUTPATIENT
Start: 2023-01-04

## 2023-01-04 RX ORDER — METHOTREXATE 2.5 MG/1
TABLET ORAL
Qty: 40 TABLET | OUTPATIENT
Start: 2023-01-04

## 2023-01-04 RX ORDER — HYDROXYCHLOROQUINE SULFATE 200 MG/1
TABLET, FILM COATED ORAL
Qty: 135 TABLET | Refills: 2 | Status: SHIPPED | OUTPATIENT
Start: 2023-01-04

## 2023-01-05 DIAGNOSIS — R79.89 LFT ELEVATION: Primary | ICD-10-CM

## 2023-01-05 NOTE — PROGRESS NOTES
1. LFT elevation  -     Hepatic Function Panel; Future  Decrease Methotrexate to 20mg po weekly   Repeat labs in 4 weeks.

## 2023-01-09 NOTE — TELEPHONE ENCOUNTER
DOLV: 12/29/22  DONV: 4/11/23  LAST LAB DRAW: 1/3/23  LAST TB TEST: N/A    Lab Results   Component Value Date     01/03/2023    K 4.2 01/03/2023     01/03/2023    CO2 25 01/03/2023    BUN 23 01/03/2023    CREATININE 0.88 01/03/2023    GLUCOSE 76 01/03/2023    CALCIUM 11.0 01/03/2023    PROT 6.5 08/30/2022    LABALBU 4.5 01/03/2023    BILITOT 0.3 01/03/2023    ALKPHOS 93 01/03/2023    AST 35 01/03/2023    ALT 54 01/03/2023    LABGLOM 79 01/03/2023       Recent Labs     01/03/23  0000   WBC 5.5   HGB 11.4*   HCT 34.5*   MCV 93.2          Lab Results   Component Value Date    SEDRATE 10 01/03/2023       Lab Results   Component Value Date    CRP 0.3 01/03/2023

## 2023-01-19 ENCOUNTER — HOSPITAL ENCOUNTER (OUTPATIENT)
Age: 53
Discharge: HOME | End: 2023-01-19
Payer: COMMERCIAL

## 2023-01-19 ENCOUNTER — HOSPITAL ENCOUNTER (OUTPATIENT)
Dept: GENERAL RADIOLOGY | Age: 53
Discharge: HOME | End: 2023-01-19
Payer: COMMERCIAL

## 2023-01-19 DIAGNOSIS — M25.561 CHRONIC PAIN OF RIGHT KNEE: ICD-10-CM

## 2023-01-19 DIAGNOSIS — G89.29 CHRONIC PAIN OF RIGHT KNEE: ICD-10-CM

## 2023-01-19 DIAGNOSIS — Z87.39 H/O RHEUMATOID ARTHRITIS: ICD-10-CM

## 2023-01-19 PROCEDURE — 73562 X-RAY EXAM OF KNEE 3: CPT

## 2023-01-19 NOTE — RESULT ENCOUNTER NOTE
The x-ray of the knee revealed mild to moderate arthritic changes most pronounced within the patellofemoral joint (space between the kneecap and the thigh bone).

## 2023-02-03 RX ORDER — METHOTREXATE 2.5 MG/1
TABLET ORAL
Qty: 32 TABLET | Refills: 0 | OUTPATIENT
Start: 2023-02-03

## 2023-02-08 DIAGNOSIS — E83.52 HYPERCALCEMIA: Primary | ICD-10-CM

## 2023-02-08 NOTE — PROGRESS NOTES
Diagnosis Orders   1. Hypercalcemia  PTH, Intact    Vitamin D 25 Hydroxy    Phosphorus    Magnesium        -- patient to have labs repeated as soon as possible.

## 2023-02-14 ENCOUNTER — TELEPHONE (OUTPATIENT)
Dept: RHEUMATOLOGY | Age: 53
End: 2023-02-14

## 2023-02-14 NOTE — TELEPHONE ENCOUNTER
Cruz Gonzalez, DO  [facility] Clinical [facility]    Please call and inform the patient of the lab abnormalities. The calcium level has continued to rise. A few additional labs have been ordered to further evaluate potential causes. I would like for you to discontinue any calcium or vitamin D supplement you are currently taking. The methotrexate should not be associated with this elevated calcium level.       See mychart encounter from 02/13/23

## 2023-02-17 ENCOUNTER — TELEPHONE (OUTPATIENT)
Dept: RHEUMATOLOGY | Age: 53
End: 2023-02-17

## 2023-02-17 NOTE — TELEPHONE ENCOUNTER
----- Message from Tish Polk DO sent at 2/17/2023 10:19 AM EST -----   The calcium level is currently mildly elevated and improved from the prior evaluation.

## 2023-02-21 RX ORDER — METHOTREXATE 2.5 MG/1
TABLET ORAL
Qty: 32 TABLET | Refills: 0 | Status: SHIPPED | OUTPATIENT
Start: 2023-02-21

## 2023-02-21 NOTE — TELEPHONE ENCOUNTER
DOLV: 12/29/22  DONV: 04/11/23  LAST LAB DRAW: 02/07/23  LAST TB TEST: NA    Lab Results   Component Value Date     02/16/2023    K 4.5 02/16/2023     02/16/2023    CO2 24 02/16/2023    BUN 20 02/16/2023    CREATININE 0.89 02/16/2023    GLUCOSE 70 02/16/2023    CALCIUM 10.7 (H) 02/16/2023    PROT 6.3 02/16/2023    LABALBU 4.2 02/16/2023    BILITOT 0.2 02/16/2023    ALKPHOS 88 02/16/2023    AST 19 02/16/2023    ALT 14 02/16/2023    LABGLOM 79 01/03/2023       Recent Labs     02/07/23  1610   WBC 6.2   HGB 12.4   HCT 37.6   MCV 92.6          Lab Results   Component Value Date    SEDRATE 13 02/07/2023       Lab Results   Component Value Date    CRP <0.3 02/07/2023

## 2023-03-02 ENCOUNTER — HOSPITAL ENCOUNTER (OUTPATIENT)
Dept: MAMMOGRAPHY | Age: 53
Discharge: HOME | End: 2023-03-02
Payer: COMMERCIAL

## 2023-03-02 DIAGNOSIS — Z12.31 VISIT FOR SCREENING MAMMOGRAM: ICD-10-CM

## 2023-03-02 PROCEDURE — 77067 SCR MAMMO BI INCL CAD: CPT

## 2023-03-20 RX ORDER — METHOTREXATE 2.5 MG/1
TABLET ORAL
Qty: 32 TABLET | Refills: 0 | Status: SHIPPED | OUTPATIENT
Start: 2023-03-20

## 2023-03-20 NOTE — TELEPHONE ENCOUNTER
DOLV: 12/29/23  DONV: 4/11/23  LAST LAB DRAW: 1/3/23    Lab Results   Component Value Date     02/16/2023    K 4.5 02/16/2023     02/16/2023    CO2 24 02/16/2023    BUN 20 02/16/2023    CREATININE 0.89 02/16/2023    GLUCOSE 70 02/16/2023    CALCIUM 10.7 (H) 02/16/2023    PROT 6.3 02/16/2023    LABALBU 4.2 02/16/2023    BILITOT 0.2 02/16/2023    ALKPHOS 88 02/16/2023    AST 19 02/16/2023    ALT 14 02/16/2023    LABGLOM 79 01/03/2023       No results for input(s): WBC, HGB, HCT, MCV, PLT in the last 720 hours.     Lab Results   Component Value Date    SEDRATE 13 02/07/2023       Lab Results   Component Value Date    CRP <0.3 02/07/2023

## 2023-04-30 DIAGNOSIS — Z51.81 MEDICATION MONITORING ENCOUNTER: Primary | ICD-10-CM

## 2023-06-01 NOTE — TELEPHONE ENCOUNTER
DOLV: 04/11/23  DONV: 07/06/23  LAST LAB DRAW: 05/03/23  LAST TB TEST: na    Lab Results   Component Value Date     05/03/2023    K 4.0 05/03/2023     05/03/2023    CO2 26 05/03/2023    BUN 23 (H) 05/03/2023    CREATININE 0.93 05/03/2023    GLUCOSE 84 05/03/2023    CALCIUM 11.1 (H) 05/03/2023    PROT 6.6 05/03/2023    LABALBU 4.3 05/03/2023    BILITOT 0.3 05/03/2023    ALKPHOS 90 05/03/2023    AST 19 05/03/2023    ALT 11 05/03/2023    LABGLOM 79 01/03/2023       Recent Labs     05/03/23  1621   WBC 5.1   HGB 12.7   HCT 37.6   MCV 89.1          Lab Results   Component Value Date    SEDRATE 9 05/03/2023       Lab Results   Component Value Date    CRP <0.3 05/03/2023

## 2023-07-06 ENCOUNTER — OFFICE VISIT (OUTPATIENT)
Dept: RHEUMATOLOGY | Age: 53
End: 2023-07-06

## 2023-07-06 VITALS
HEIGHT: 67 IN | HEART RATE: 74 BPM | DIASTOLIC BLOOD PRESSURE: 84 MMHG | WEIGHT: 147.6 LBS | SYSTOLIC BLOOD PRESSURE: 134 MMHG | OXYGEN SATURATION: 99 % | BODY MASS INDEX: 23.17 KG/M2

## 2023-07-06 DIAGNOSIS — I73.00 RAYNAUD'S DISEASE WITHOUT GANGRENE: ICD-10-CM

## 2023-07-06 DIAGNOSIS — M25.561 RIGHT KNEE PAIN, UNSPECIFIED CHRONICITY: ICD-10-CM

## 2023-07-06 DIAGNOSIS — E83.52 HYPERCALCEMIA: ICD-10-CM

## 2023-07-06 DIAGNOSIS — Z51.81 MEDICATION MONITORING ENCOUNTER: ICD-10-CM

## 2023-07-06 DIAGNOSIS — Z87.39 H/O RHEUMATOID ARTHRITIS: Primary | ICD-10-CM

## 2023-07-06 RX ORDER — HYDROXYCHLOROQUINE SULFATE 200 MG/1
TABLET, FILM COATED ORAL
Qty: 135 TABLET | Refills: 2 | Status: SHIPPED | OUTPATIENT
Start: 2023-07-06

## 2023-07-06 RX ORDER — FOLIC ACID 1 MG/1
1 TABLET ORAL DAILY
Qty: 90 TABLET | Refills: 1 | Status: SHIPPED | OUTPATIENT
Start: 2023-07-06

## 2023-07-06 ASSESSMENT — ENCOUNTER SYMPTOMS
EYES NEGATIVE: 1
RESPIRATORY NEGATIVE: 1
GASTROINTESTINAL NEGATIVE: 1

## 2023-07-06 NOTE — PROGRESS NOTES
05/03/2023 1621        Component Value Date/Time    CALCIUM 11.1 (H) 05/03/2023 1621    ALKPHOS 90 05/03/2023 1621    ALKPHOS 93 01/03/2023 0000    AST 19 05/03/2023 1621    ALT 11 05/03/2023 1621    BILITOT 0.3 05/03/2023 1621            Lab Results   Component Value Date    SEDRATE 9 05/03/2023    SEDRATE 13 02/07/2023    SEDRATE 10 01/03/2023    CRP <0.3 05/03/2023    CRP <0.3 02/07/2023    CRP <0.3 01/03/2023         RADIOLOGY / PROCEDURES:     Assessment  / Plan   No diagnosis found. Seronegative rheumatoid arthritis vs H/O psoriatic arthritis  - joint pain and swelling bilateral hands and wrists in 2005 which progressed to generalized joint pains and swelling. Diagnosed with rheumatoid arthritis by Dr. Claire Ness. Transitioned care to Dr. Nicole Monet about 8 years ago. Has had eczema since a child, but there was a concern of psoriasis at some point and was treated for ? psoriaitc arthritis. Exam with tenderness PIPs, NO swelling.   --- prior tx: remicaide (relief), humira (hair loss, ? Relief), xeljanz (hair loss, ? Relief), otezla (no relief)                 - plaquenil 400 mg BID               - methotrexate 25 mg PO weekly & folic acid 1 mg daily (Aug 2022 to 25mg)                - naproxen 500mg twice daily prn. Flexural eczema -    resolved w/ triamcinolone cream prn                - previously diagnosed as eczema. + strong famhx of eczema     Right knee patellofemoral osteoarthritis    - localized, intermittent,   -- mild relief with knee bracing. Right shoulder pain - persistent pain. - patient was going to pursue therapy at work. - declined xray of the shoulder ordered. -     Raynaud's disease without gangrene - stable w/ conservative tx. Long term urse of NSAID    Medication monitoring encounter -- may 2022 labs reviewed with the patient                - CBC, CMP, sed rate, CRP q 12 weeks                - Plaquenil eye exam (April 2022)       1.  H/O rheumatoid arthritis  -

## 2023-07-31 LAB — VITAMIN D 25-HYDROXY: 53 NG/ML

## 2023-08-01 LAB — PHOSPHORUS: 3.1 MG/DL (ref 2.5–4.5)

## 2023-08-02 LAB
CALCIUM IONIZED SERUM: 5.67 MG/DL (ref 4.7–5.9)
PARATHYROID HORMONE INTACT: 116 PG/ML (ref 15–65)

## 2023-08-09 DIAGNOSIS — Z87.39 H/O RHEUMATOID ARTHRITIS: ICD-10-CM

## 2023-08-26 DIAGNOSIS — Z87.39 H/O RHEUMATOID ARTHRITIS: ICD-10-CM

## 2023-08-28 DIAGNOSIS — Z87.39 H/O RHEUMATOID ARTHRITIS: ICD-10-CM

## 2023-08-29 RX ORDER — NAPROXEN 500 MG/1
500 TABLET ORAL 2 TIMES DAILY PRN
Qty: 60 TABLET | Refills: 2 | Status: SHIPPED | OUTPATIENT
Start: 2023-08-29

## 2023-08-30 DIAGNOSIS — Z87.39 H/O RHEUMATOID ARTHRITIS: ICD-10-CM

## 2023-08-31 LAB
ABSOLUTE BASO #: 0.09 K/UL (ref 0–0.2)
ABSOLUTE EOS #: 0.22 K/UL (ref 0–0.5)
ABSOLUTE LYMPH #: 1.48 K/UL (ref 1–4)
ABSOLUTE MONO #: 0.62 K/UL (ref 0.2–1)
ABSOLUTE NEUT #: 2.83 K/UL (ref 1.5–7.5)
ALBUMIN SERPL-MCNC: 4.6 G/DL (ref 3.5–5.2)
ALK PHOSPHATASE: 93 U/L (ref 40–133)
ALT SERPL-CCNC: 18 U/L (ref 5–40)
ANION GAP SERPL CALCULATED.3IONS-SCNC: 9 MEQ/L (ref 7–16)
AST SERPL-CCNC: 21 U/L (ref 9–40)
BASOPHILS RELATIVE PERCENT: 1.7 %
BILIRUB SERPL-MCNC: 0.3 MG/DL
BUN BLDV-MCNC: 20 MG/DL (ref 6–20)
C-REACTIVE PROTEIN: <0.3 MG/DL
CALCIUM SERPL-MCNC: 11.1 MG/DL (ref 8.5–10.5)
CHLORIDE BLD-SCNC: 106 MEQ/L (ref 95–107)
CO2: 26 MEQ/L (ref 19–31)
CREAT SERPL-MCNC: 0.94 MG/DL (ref 0.6–1.3)
EGFR IF NONAFRICAN AMERICAN: 72 ML/MIN/1.73
EOSINOPHILS RELATIVE PERCENT: 4.2 %
GLUCOSE: 85 MG/DL (ref 70–99)
HCT VFR BLD CALC: 35.2 % (ref 34–45)
HEMOGLOBIN: 11.6 G/DL (ref 11.5–15.5)
LYMPHOCYTE %: 28.2 %
MCH RBC QN AUTO: 30 PG (ref 25–33)
MCHC RBC AUTO-ENTMCNC: 33 G/DL (ref 31–36)
MCV RBC AUTO: 91 FL (ref 80–99)
MONOCYTES # BLD: 11.8 %
NEUTROPHILS RELATIVE PERCENT: 53.9 %
PDW BLD-RTO: 12.8 % (ref 11.5–15)
PLATELETS: 249 K/UL (ref 130–400)
PMV BLD AUTO: 11.8 FL (ref 9.3–13)
POTASSIUM SERPL-SCNC: 4.5 MEQ/L (ref 3.5–5.4)
RBC: 3.87 M/UL (ref 3.8–5.4)
SODIUM BLD-SCNC: 141 MEQ/L (ref 133–146)
TOTAL PROTEIN: 6.6 G/DL (ref 6.1–8.3)
WBC: 5.3 K/UL (ref 3.5–11)

## 2023-09-01 DIAGNOSIS — Z87.39 H/O RHEUMATOID ARTHRITIS: ICD-10-CM

## 2023-09-01 LAB — SEDIMENTATION RATE, ERYTHROCYTE: 6 MM/HR (ref 0–20)

## 2023-09-01 NOTE — TELEPHONE ENCOUNTER
DOLV: 7/6/23  DONV: 1/8/24  LAST LAB DRAW: 8/31/23  LAST TB TEST: N/A    Lab Results   Component Value Date     08/31/2023    K 4.5 08/31/2023     08/31/2023    CO2 26 08/31/2023    BUN 20 08/31/2023    CREATININE 0.94 08/31/2023    GLUCOSE 85 08/31/2023    CALCIUM 11.1 (H) 08/31/2023    PROT 6.6 08/31/2023    LABALBU 4.6 08/31/2023    BILITOT 0.3 08/31/2023    ALKPHOS 93 08/31/2023    AST 21 08/31/2023    ALT 18 08/31/2023    LABGLOM 79 01/03/2023       Recent Labs     08/31/23  1558   WBC 5.3   HGB 11.6   HCT 35.2   MCV 91.0          Lab Results   Component Value Date    SEDRATE 6 08/31/2023       Lab Results   Component Value Date    CRP <0.3 08/31/2023

## 2023-10-06 DIAGNOSIS — Z87.39 H/O RHEUMATOID ARTHRITIS: ICD-10-CM

## 2023-10-09 RX ORDER — HYDROXYCHLOROQUINE SULFATE 200 MG/1
TABLET, FILM COATED ORAL
Qty: 135 TABLET | Refills: 2 | Status: SHIPPED | OUTPATIENT
Start: 2023-10-09

## 2024-01-08 DIAGNOSIS — Z87.39 H/O RHEUMATOID ARTHRITIS: ICD-10-CM

## 2024-01-09 ENCOUNTER — TELEPHONE (OUTPATIENT)
Dept: RHEUMATOLOGY | Age: 54
End: 2024-01-09

## 2024-01-09 DIAGNOSIS — Z51.81 MEDICATION MONITORING ENCOUNTER: Primary | ICD-10-CM

## 2024-01-09 LAB
ABSOLUTE BASO #: 0.1 K/UL (ref 0–0.2)
ABSOLUTE EOS #: 0.41 K/UL (ref 0–0.5)
ABSOLUTE LYMPH #: 1.61 K/UL (ref 1–4)
ABSOLUTE MONO #: 0.43 K/UL (ref 0.2–1)
ABSOLUTE NEUT #: 4.06 K/UL (ref 1.5–7.5)
ALBUMIN SERPL-MCNC: 4.6 G/DL (ref 3.5–5.2)
ALK PHOSPHATASE: 110 U/L (ref 40–133)
ALT SERPL-CCNC: 18 U/L (ref 5–40)
ANION GAP SERPL CALCULATED.3IONS-SCNC: 8 MEQ/L (ref 7–16)
AST SERPL-CCNC: 25 U/L (ref 9–40)
BASOPHILS RELATIVE PERCENT: 1.5 %
BILIRUB SERPL-MCNC: 0.4 MG/DL
BUN BLDV-MCNC: 19 MG/DL (ref 6–20)
C-REACTIVE PROTEIN: <0.3 MG/DL
CALCIUM SERPL-MCNC: 11 MG/DL (ref 8.5–10.5)
CHLORIDE BLD-SCNC: 105 MEQ/L (ref 95–107)
CO2: 28 MEQ/L (ref 19–31)
CREAT SERPL-MCNC: 0.83 MG/DL (ref 0.6–1.3)
EGFR IF NONAFRICAN AMERICAN: 84 ML/MIN/1.73
EOSINOPHILS RELATIVE PERCENT: 6.2 %
GLUCOSE: 93 MG/DL (ref 70–99)
HCT VFR BLD CALC: 35.2 % (ref 34–45)
HEMOGLOBIN: 11.8 G/DL (ref 11.5–15.5)
LYMPHOCYTE %: 24.2 %
MCH RBC QN AUTO: 29.8 PG (ref 25–33)
MCHC RBC AUTO-ENTMCNC: 33.5 G/DL (ref 31–36)
MCV RBC AUTO: 88.9 FL (ref 80–99)
MONOCYTES # BLD: 6.5 %
NEUTROPHILS RELATIVE PERCENT: 61.1 %
PDW BLD-RTO: 13.6 % (ref 11.5–15)
PLATELETS: 266 K/UL (ref 130–400)
PMV BLD AUTO: 11.7 FL (ref 9.3–13)
POTASSIUM SERPL-SCNC: 4.2 MEQ/L (ref 3.5–5.4)
RBC: 3.96 M/UL (ref 3.8–5.4)
SEDIMENTATION RATE, ERYTHROCYTE: 10 MM/HR (ref 0–20)
SODIUM BLD-SCNC: 141 MEQ/L (ref 133–146)
TOTAL PROTEIN: 6.5 G/DL (ref 6.1–8.3)
WBC: 6.6 K/UL (ref 3.5–11)

## 2024-01-09 RX ORDER — METHOTREXATE 2.5 MG/1
TABLET ORAL
Qty: 32 TABLET | Refills: 2 | OUTPATIENT
Start: 2024-01-09

## 2024-01-10 ENCOUNTER — TELEPHONE (OUTPATIENT)
Dept: RHEUMATOLOGY | Age: 54
End: 2024-01-10

## 2024-01-10 DIAGNOSIS — E21.3 HYPERPARATHYROIDISM (HCC): Primary | ICD-10-CM

## 2024-01-10 NOTE — TELEPHONE ENCOUNTER
Hyperparathyroidism:  Patient has seen PCP and recent evaluation by endocrinologist with [facility].  PTH elevation has worsened per patient.  Currently waiting to hear back from endocrinologist at [facility] about the next steps.     We discussed possible sestamibi scan but ultimately this would be dependent upon what the endocrinologist felt was necessary.    If you have any questions please feel free to contact the office or send me a my chart message.

## 2024-01-24 DIAGNOSIS — Z87.39 H/O RHEUMATOID ARTHRITIS: ICD-10-CM

## 2024-01-24 RX ORDER — METHOTREXATE 2.5 MG/1
TABLET ORAL
Qty: 32 TABLET | Refills: 2 | Status: SHIPPED | OUTPATIENT
Start: 2024-01-24

## 2024-01-24 NOTE — TELEPHONE ENCOUNTER
DOLV: 7/6/23  DONV: 3/12/24  LAST LAB DRAW: 1/9/24  LAST TB TEST: N/A    Lab Results   Component Value Date     01/09/2024    K 4.2 01/09/2024     01/09/2024    CO2 28 01/09/2024    BUN 19 01/09/2024    CREATININE 0.83 01/09/2024    GLUCOSE 93 01/09/2024    CALCIUM 11.0 (H) 01/09/2024    PROT 6.5 01/09/2024    LABALBU 4.6 01/09/2024    BILITOT 0.4 01/09/2024    ALKPHOS 110 01/09/2024    AST 25 01/09/2024    ALT 18 01/09/2024    LABGLOM 79 01/03/2023       Recent Labs     01/09/24  1623   WBC 6.6   HGB 11.8   HCT 35.2   MCV 88.9          Lab Results   Component Value Date    SEDRATE 10 01/09/2024       Lab Results   Component Value Date    CRP <0.3 01/09/2024

## 2024-03-12 ENCOUNTER — OFFICE VISIT (OUTPATIENT)
Dept: RHEUMATOLOGY | Age: 54
End: 2024-03-12
Payer: COMMERCIAL

## 2024-03-12 VITALS
WEIGHT: 154 LBS | SYSTOLIC BLOOD PRESSURE: 122 MMHG | OXYGEN SATURATION: 99 % | DIASTOLIC BLOOD PRESSURE: 84 MMHG | BODY MASS INDEX: 24.17 KG/M2 | HEIGHT: 67 IN | HEART RATE: 64 BPM

## 2024-03-12 DIAGNOSIS — Z87.39 H/O RHEUMATOID ARTHRITIS: Primary | ICD-10-CM

## 2024-03-12 DIAGNOSIS — Z51.81 MEDICATION MONITORING ENCOUNTER: ICD-10-CM

## 2024-03-12 DIAGNOSIS — M17.12 OSTEOARTHRITIS OF LEFT PATELLOFEMORAL JOINT: ICD-10-CM

## 2024-03-12 DIAGNOSIS — M17.11 PATELLOFEMORAL ARTHRITIS OF RIGHT KNEE: ICD-10-CM

## 2024-03-12 DIAGNOSIS — I73.00 RAYNAUD'S DISEASE WITHOUT GANGRENE: ICD-10-CM

## 2024-03-12 DIAGNOSIS — S46.211S RUPTURE OF RIGHT BICEPS TENDON, SEQUELA: ICD-10-CM

## 2024-03-12 PROBLEM — S46.211A RUPTURE OF RIGHT BICEPS TENDON: Status: ACTIVE | Noted: 2024-03-12

## 2024-03-12 PROCEDURE — 3017F COLORECTAL CA SCREEN DOC REV: CPT | Performed by: INTERNAL MEDICINE

## 2024-03-12 PROCEDURE — G8484 FLU IMMUNIZE NO ADMIN: HCPCS | Performed by: INTERNAL MEDICINE

## 2024-03-12 PROCEDURE — G8427 DOCREV CUR MEDS BY ELIG CLIN: HCPCS | Performed by: INTERNAL MEDICINE

## 2024-03-12 PROCEDURE — G8420 CALC BMI NORM PARAMETERS: HCPCS | Performed by: INTERNAL MEDICINE

## 2024-03-12 PROCEDURE — 99214 OFFICE O/P EST MOD 30 MIN: CPT | Performed by: INTERNAL MEDICINE

## 2024-03-12 PROCEDURE — 1036F TOBACCO NON-USER: CPT | Performed by: INTERNAL MEDICINE

## 2024-03-12 ASSESSMENT — ENCOUNTER SYMPTOMS
EYES NEGATIVE: 1
RESPIRATORY NEGATIVE: 1
GASTROINTESTINAL NEGATIVE: 1

## 2024-03-12 NOTE — PROGRESS NOTES
[facility] RHEUMATOLOGY FOLLOW UP NOTE     Date Of Service: 3/12/2024  Provider: KYLE SHORE DO ,   PCP: Inder Jack MD   Name: Viktoriya Andersen   MRN: [id number]            Subjective   CHIEF COMPLAINT:    Chief Complaint   Patient presents with    Follow-up     6 month f/u h/o RA        Viktoriya Andersen  is a(n)53 y.o. female  here for the f/u evaluation of seronegative RA, medication monitoring, raynauds      Diagnosed with hyperparathyroidism since the last evaluation.  Currently evaluation by endocrinologist with [facility]    Ruptured bicep tendon - [facility] wanting MRI completed -- seeing phys therapy at work - performing home stretches but has not continued to follow with phys therapy.     Triamcinolone cream - cleared the ecematous rash.     Currently reporting active joint pains affecting the right shoulder and right knee with pain up to 8 or  out of 10 over the past week.Alleviating: naproxen, movement, hot shower. Aggravating: weather changes, wt bearing, activity. Reported joint swelling in the Right knee.     -- eczema resolved w/ steroid cream    -- right shoulder injury with working out - strained sensation and some pain radiating into the neck and upper arms. Some numbness in the right hand at times.  Continued Right knee pain up to 3.5/10 over the past week. No specific . Timing.       Denies joint swelling ro redness.                Raynauds bilateral feet. Improved w/ no recent color changes.           REVIEW OF SYSTEMS: (ROS)    Review of Systems   Constitutional: Negative.    HENT: Negative.     Eyes: Negative.    Respiratory: Negative.     Cardiovascular: Negative.    Gastrointestinal: Negative.    Endocrine: Negative.    Genitourinary: Negative.    Skin: Negative.    Neurological: Negative.    Hematological: Negative.          Past Medical History:     has a past medical history of Depression, Graves disease, Hypothyroidism, and Rheumatoid arthritis (HCC).    Current Medications:

## 2024-04-28 DIAGNOSIS — Z87.39 H/O RHEUMATOID ARTHRITIS: ICD-10-CM

## 2024-04-29 RX ORDER — METHOTREXATE 2.5 MG/1
TABLET ORAL
Qty: 32 TABLET | Refills: 2 | OUTPATIENT
Start: 2024-04-29

## 2024-05-01 RX ORDER — NAPROXEN 500 MG/1
500 TABLET ORAL 2 TIMES DAILY PRN
Qty: 60 TABLET | Refills: 2 | Status: SHIPPED | OUTPATIENT
Start: 2024-05-01

## 2024-05-02 LAB
ALBUMIN: 4.4 G/DL (ref 3.5–5.2)
ALK PHOSPHATASE: 104 U/L (ref 40–133)
ALT SERPL-CCNC: 16 U/L (ref 5–40)
ANION GAP SERPL CALCULATED.3IONS-SCNC: 9 MEQ/L (ref 7–16)
AST SERPL-CCNC: 24 U/L (ref 9–40)
BASOPHILS ABSOLUTE: 0.08 K/UL (ref 0–0.2)
BASOPHILS RELATIVE PERCENT: 1.6 %
BILIRUB SERPL-MCNC: 0.3 MG/DL
BUN BLDV-MCNC: 22 MG/DL (ref 6–20)
C-REACTIVE PROTEIN: <0.3 MG/DL
CALCIUM SERPL-MCNC: 9.2 MG/DL (ref 8.5–10.5)
CHLORIDE BLD-SCNC: 107 MEQ/L (ref 95–107)
CO2: 24 MEQ/L (ref 19–31)
CREAT SERPL-MCNC: 0.86 MG/DL (ref 0.6–1.3)
EGFR IF NONAFRICAN AMERICAN: 80 ML/MIN/1.73
EOSINOPHILS ABSOLUTE: 0.32 K/UL (ref 0–0.5)
EOSINOPHILS RELATIVE PERCENT: 6.6 %
GLUCOSE: 93 MG/DL (ref 70–99)
HCT VFR BLD CALC: 35.3 % (ref 34–45)
HEMOGLOBIN: 11.7 G/DL (ref 11.5–15.5)
IMMATURE GRANS (ABS): 0.01
IMMATURE GRANULOCYTES %: 0.2 %
LYMPHOCYTES ABSOLUTE: 1.09 K/UL (ref 1–4)
LYMPHOCYTES RELATIVE PERCENT: 22.5 %
MCH RBC QN AUTO: 29.8 PG (ref 25–33)
MCHC RBC AUTO-ENTMCNC: 33.1 G/DL (ref 31–36)
MCV RBC AUTO: 90.1 FL (ref 80–99)
MONOCYTES ABSOLUTE: 0.49 K/UL (ref 0.2–1)
MONOCYTES RELATIVE PERCENT: 10.1 %
NEUTROPHILS ABSOLUTE: 2.86 K/UL (ref 1.5–7.5)
NEUTROPHILS RELATIVE PERCENT: 59 %
PDW BLD-RTO: 13.3 % (ref 11.5–15)
PLATELET # BLD: 213 K/UL (ref 130–400)
PMV BLD AUTO: 11.2 FL (ref 9.3–13)
POTASSIUM SERPL-SCNC: 4.3 MEQ/L (ref 3.5–5.4)
RBC # BLD: 3.92 M/UL (ref 3.8–5.4)
SEDIMENTATION RATE, ERYTHROCYTE: 8 MM/HR (ref 0–20)
SODIUM BLD-SCNC: 140 MEQ/L (ref 133–146)
TOTAL PROTEIN: 6.3 G/DL (ref 6.1–8.3)
WBC # BLD: 4.9 K/UL (ref 3.5–11)

## 2024-05-03 DIAGNOSIS — Z87.39 H/O RHEUMATOID ARTHRITIS: ICD-10-CM

## 2024-05-03 RX ORDER — METHOTREXATE 2.5 MG/1
TABLET ORAL
Qty: 32 TABLET | Refills: 2 | Status: SHIPPED | OUTPATIENT
Start: 2024-05-03

## 2024-07-03 ENCOUNTER — PATIENT MESSAGE (OUTPATIENT)
Dept: RHEUMATOLOGY | Age: 54
End: 2024-07-03

## 2024-07-03 NOTE — TELEPHONE ENCOUNTER
From: Viktoriya Andersen  To: Dr. Octavia Cabrera  Sent: 7/3/2024 6:25 AM EDT  Subject: Appointment Request    Appointment Request From: Viktoriya Andersen    With Provider: OCTAVIA CABRERA DO [[facility]]    Preferred Date Range: 7/5/2024 – 7/11/2024    Preferred Times: Any Time    Reason for visit: Request an Appointment    Comments:  Routine

## 2024-07-11 ENCOUNTER — OFFICE VISIT (OUTPATIENT)
Dept: RHEUMATOLOGY | Age: 54
End: 2024-07-11
Payer: COMMERCIAL

## 2024-07-11 VITALS
WEIGHT: 143.6 LBS | DIASTOLIC BLOOD PRESSURE: 84 MMHG | SYSTOLIC BLOOD PRESSURE: 140 MMHG | HEART RATE: 96 BPM | OXYGEN SATURATION: 98 % | HEIGHT: 67 IN | BODY MASS INDEX: 22.54 KG/M2

## 2024-07-11 DIAGNOSIS — M17.11 PATELLOFEMORAL ARTHRITIS OF RIGHT KNEE: ICD-10-CM

## 2024-07-11 DIAGNOSIS — M17.12 OSTEOARTHRITIS OF LEFT PATELLOFEMORAL JOINT: ICD-10-CM

## 2024-07-11 DIAGNOSIS — I73.00 RAYNAUD'S DISEASE WITHOUT GANGRENE: Primary | ICD-10-CM

## 2024-07-11 DIAGNOSIS — Z87.39 H/O RHEUMATOID ARTHRITIS: ICD-10-CM

## 2024-07-11 DIAGNOSIS — Z51.81 MEDICATION MONITORING ENCOUNTER: ICD-10-CM

## 2024-07-11 DIAGNOSIS — S46.211S RUPTURE OF RIGHT BICEPS TENDON, SEQUELA: ICD-10-CM

## 2024-07-11 PROCEDURE — 99214 OFFICE O/P EST MOD 30 MIN: CPT | Performed by: NURSE PRACTITIONER

## 2024-07-11 RX ORDER — NAPROXEN 500 MG/1
500 TABLET ORAL 2 TIMES DAILY PRN
Qty: 60 TABLET | Refills: 2 | Status: SHIPPED | OUTPATIENT
Start: 2024-07-11

## 2024-07-11 RX ORDER — METHOTREXATE 2.5 MG/1
TABLET ORAL
Qty: 32 TABLET | Refills: 0 | Status: SHIPPED | OUTPATIENT
Start: 2024-07-11

## 2024-07-11 RX ORDER — FOLIC ACID 1 MG/1
1 TABLET ORAL DAILY
Qty: 90 TABLET | Refills: 1 | Status: SHIPPED | OUTPATIENT
Start: 2024-07-11

## 2024-07-11 RX ORDER — HYDROXYCHLOROQUINE SULFATE 200 MG/1
300 TABLET, FILM COATED ORAL DAILY
Qty: 135 TABLET | Refills: 1 | Status: SHIPPED | OUTPATIENT
Start: 2024-07-11

## 2024-07-11 ASSESSMENT — ENCOUNTER SYMPTOMS
SHORTNESS OF BREATH: 0
DIARRHEA: 0
BACK PAIN: 0
EYE PAIN: 0
ABDOMINAL PAIN: 0
CONSTIPATION: 0
EYE ITCHING: 0
NAUSEA: 0
TROUBLE SWALLOWING: 0
COUGH: 0

## 2024-07-11 NOTE — PROGRESS NOTES
AM    AST 24 05/02/2024 07:29 AM    BILIDIR <0.2 12/08/2019 12:22 AM       HgBA1c: No components found for: \"HGBA1C\"    Lab Results   Component Value Date/Time    VITD25 42 09/14/2023 04:12 PM         No results found for: \"ANASCRN\"  No results found for: \"SSA\"  No results found for: \"SSB\"  No results found for: \"ANTI-SMITH\"  No results found for: \"DSDNAAB\"   No results found for: \"ANTIRNP\"  No results found for: \"C3\", \"C4\"  No results found for: \"CCPAB\"  Lab Results   Component Value Date    RF 11 12/28/2020       No components found for: \"CANCASCRN\", \"APANCASCRN\"  Lab Results   Component Value Date    SEDRATE 8 05/02/2024     Lab Results   Component Value Date    CRP <0.3 05/02/2024       RADIOLOGY:         ASSESSMENT/PLAN    Assessment   Plan     Seronegative rheumatoid arthritis  H/O psoriatic arthritis  - joint pain and swelling bilateral hands and wrists in 2005 which progressed to generalized joint pains and swelling. Diagnosed with rheumatoid arthritis by Dr. Rangel. Transitioned care to Dr. Harry about 8 years ago. Has had eczema since a child, but there was a concern of psoriasis at some point and was treated for ? psoriaitc arthritis. Currently taking methotrexate 15 mg PO weekly, plaquenil 200 mg BID, and naproxen 500 mg mg BID. Reports ongoing joint pains in wrists, hands, elbows, shoulders, neck, low, back, and feet. Exam with tenderness PIPs, NO swelling.   - prior tx: remicaide (relief), humira (hair loss, ? Relief), xeljanz (hair loss, ? Relief), otezla (no relief)               - plaquenil 300 mg daily (decreased for wt based dosing)                - methotrexate 15 mg PO weekly & folic acid 1 mg daily     Patellofemoral osteoarthritis   - continue naproxen PRN     Right shoulder pain- persistent pain- s/p right bicep tendon rupture   - following with [facility]      Raynaud's disease without gangrene  - reports color changes of the hands     Medication monitoring encounter               - CBC, CMP,

## 2024-09-18 DIAGNOSIS — Z87.39 H/O RHEUMATOID ARTHRITIS: ICD-10-CM

## 2024-09-18 LAB
ALBUMIN: 4.5 G/DL (ref 3.5–5.2)
ALK PHOSPHATASE: 100 U/L (ref 40–133)
ALT SERPL-CCNC: 17 U/L (ref 5–40)
ANION GAP SERPL CALCULATED.3IONS-SCNC: 10 MEQ/L (ref 7–16)
AST SERPL-CCNC: 24 U/L (ref 9–40)
BASOPHILS ABSOLUTE: 0.08 K/UL (ref 0–0.2)
BASOPHILS RELATIVE PERCENT: 1.4 %
BILIRUB SERPL-MCNC: 0.4 MG/DL
BUN BLDV-MCNC: 17 MG/DL (ref 6–20)
CALCIUM SERPL-MCNC: 9.4 MG/DL (ref 8.5–10.5)
CHLORIDE BLD-SCNC: 107 MEQ/L (ref 95–107)
CO2: 24 MEQ/L (ref 19–31)
CREAT SERPL-MCNC: 0.93 MG/DL (ref 0.6–1.3)
EGFR IF NONAFRICAN AMERICAN: 73 ML/MIN/1.73
EOSINOPHILS ABSOLUTE: 0.36 K/UL (ref 0–0.5)
EOSINOPHILS RELATIVE PERCENT: 6.2 %
GLUCOSE: 96 MG/DL (ref 70–99)
HCT VFR BLD CALC: 38.5 % (ref 34–45)
HEMOGLOBIN: 12.3 G/DL (ref 11.5–15.5)
IMMATURE GRANS (ABS): 0.02
IMMATURE GRANULOCYTES %: 0.3 %
LYMPHOCYTES ABSOLUTE: 1.19 K/UL (ref 1–4)
LYMPHOCYTES RELATIVE PERCENT: 20.6 %
MCH RBC QN AUTO: 29.6 PG (ref 25–33)
MCHC RBC AUTO-ENTMCNC: 31.9 G/DL (ref 31–36)
MCV RBC AUTO: 92.5 FL (ref 80–99)
MONOCYTES ABSOLUTE: 0.56 K/UL (ref 0.2–1)
MONOCYTES RELATIVE PERCENT: 9.7 %
NEUTROPHILS ABSOLUTE: 3.56 K/UL (ref 1.5–7.5)
NEUTROPHILS RELATIVE PERCENT: 61.8 %
PDW BLD-RTO: 13.3 % (ref 11.5–15)
PLATELET # BLD: 220 K/UL (ref 130–400)
PMV BLD AUTO: 11.7 FL (ref 9.3–13)
POTASSIUM SERPL-SCNC: 4.6 MEQ/L (ref 3.5–5.4)
RBC # BLD: 4.16 M/UL (ref 3.8–5.4)
SED RATE, AUTOMATED: 16 MM/HR (ref 0–20)
SODIUM BLD-SCNC: 141 MEQ/L (ref 133–146)
TOTAL PROTEIN: 6.7 G/DL (ref 6.1–8.3)
WBC # BLD: 5.8 K/UL (ref 3.5–11)

## 2024-09-19 RX ORDER — FOLIC ACID 1 MG/1
1 TABLET ORAL DAILY
Qty: 90 TABLET | Refills: 1 | Status: SHIPPED | OUTPATIENT
Start: 2024-09-19

## 2024-09-19 RX ORDER — METHOTREXATE 2.5 MG/1
TABLET ORAL
Qty: 32 TABLET | Refills: 0 | OUTPATIENT
Start: 2024-09-19

## 2024-09-19 RX ORDER — HYDROXYCHLOROQUINE SULFATE 200 MG/1
300 TABLET, FILM COATED ORAL DAILY
Qty: 135 TABLET | Refills: 1 | Status: SHIPPED | OUTPATIENT
Start: 2024-09-19

## 2024-09-21 LAB — C-REACTIVE PROTEIN: 0.1 MG/DL

## 2024-09-23 DIAGNOSIS — Z87.39 H/O RHEUMATOID ARTHRITIS: ICD-10-CM

## 2024-09-23 RX ORDER — METHOTREXATE 2.5 MG/1
TABLET ORAL
Qty: 32 TABLET | Refills: 0 | Status: SHIPPED | OUTPATIENT
Start: 2024-09-23

## 2024-10-22 ENCOUNTER — PATIENT MESSAGE (OUTPATIENT)
Dept: RHEUMATOLOGY | Age: 54
End: 2024-10-22

## 2024-10-22 DIAGNOSIS — Z87.39 H/O RHEUMATOID ARTHRITIS: ICD-10-CM

## 2024-10-22 RX ORDER — METHOTREXATE 2.5 MG/1
TABLET ORAL
Qty: 32 TABLET | Refills: 0 | Status: SHIPPED | OUTPATIENT
Start: 2024-10-22

## 2024-10-22 NOTE — TELEPHONE ENCOUNTER
DOLV: 7/11/24  DONV: 11/13/24  LAST LAB DRAW: 9/18/24  LAST TB TEST: n/a    Lab Results   Component Value Date     09/18/2024    K 4.6 09/18/2024     09/18/2024    CO2 24 09/18/2024    BUN 17 09/18/2024    CREATININE 0.93 09/18/2024    GLUCOSE 96 09/18/2024    CALCIUM 9.4 09/18/2024    BILITOT 0.4 09/18/2024    ALKPHOS 100 09/18/2024    AST 24 09/18/2024    ALT 17 09/18/2024    LABGLOM 79 01/03/2023       No results for input(s): \"WBC\", \"HGB\", \"HCT\", \"MCV\", \"PLT\" in the last 720 hours.    Lab Results   Component Value Date    SEDRATE 16 09/18/2024       Lab Results   Component Value Date    CRP 0.1 09/18/2024

## 2024-12-03 RX ORDER — LEVOTHYROXINE SODIUM 137 UG/1
137 TABLET ORAL DAILY
COMMUNITY

## 2024-12-03 NOTE — PROGRESS NOTES
PAT call attempted, patient unavailable, left message to please call us back at your earliest convenience; [phone]

## 2024-12-03 NOTE — PROGRESS NOTES
NPO after midnight (may have 8 oz of water up to 2 hours before surgery)  Bring eye bag from office  Bring insurance info and drivers license  Wear comfortable clean clothing, button down top  Do not bring jewelry  Shower night before and morning of surgery with a liquid antibacterial soap  Bring list of medications with dosage and how often taken  Follow all instructions given by your physician   needed at discharge  Call [phone] for any questions

## 2024-12-05 NOTE — DISCHARGE INSTRUCTIONS
doctor immediately.    If you have additional questions, please ask your doctor or nurse.

## 2024-12-09 ENCOUNTER — ANESTHESIA (OUTPATIENT)
Dept: OPERATING ROOM | Age: 54
End: 2024-12-09
Payer: COMMERCIAL

## 2024-12-09 ENCOUNTER — HOSPITAL ENCOUNTER (OUTPATIENT)
Age: 54
Setting detail: OUTPATIENT SURGERY
Discharge: HOME | End: 2024-12-09
Attending: OPHTHALMOLOGY | Admitting: OPHTHALMOLOGY
Payer: COMMERCIAL

## 2024-12-09 ENCOUNTER — ANESTHESIA EVENT (OUTPATIENT)
Dept: OPERATING ROOM | Age: 54
End: 2024-12-09
Payer: COMMERCIAL

## 2024-12-09 VITALS
HEIGHT: 67 IN | WEIGHT: 142.2 LBS | BODY MASS INDEX: 22.32 KG/M2 | SYSTOLIC BLOOD PRESSURE: 167 MMHG | DIASTOLIC BLOOD PRESSURE: 72 MMHG | OXYGEN SATURATION: 99 % | TEMPERATURE: 97.9 F | HEART RATE: 79 BPM | RESPIRATION RATE: 16 BRPM

## 2024-12-09 DIAGNOSIS — Z87.39 H/O RHEUMATOID ARTHRITIS: ICD-10-CM

## 2024-12-09 PROCEDURE — 6370000000 HC RX 637 (ALT 250 FOR IP): Performed by: OPHTHALMOLOGY

## 2024-12-09 PROCEDURE — 2500000003 HC RX 250 WO HCPCS: Performed by: OPHTHALMOLOGY

## 2024-12-09 PROCEDURE — 3700000000 HC ANESTHESIA ATTENDED CARE: Performed by: OPHTHALMOLOGY

## 2024-12-09 PROCEDURE — 7100000010 HC PHASE II RECOVERY - FIRST 15 MIN: Performed by: OPHTHALMOLOGY

## 2024-12-09 PROCEDURE — 2709999900 HC NON-CHARGEABLE SUPPLY: Performed by: OPHTHALMOLOGY

## 2024-12-09 PROCEDURE — 3600000013 HC SURGERY LEVEL 3 ADDTL 15MIN: Performed by: OPHTHALMOLOGY

## 2024-12-09 PROCEDURE — 6360000002 HC RX W HCPCS: Performed by: NURSE ANESTHETIST, CERTIFIED REGISTERED

## 2024-12-09 PROCEDURE — 3700000001 HC ADD 15 MINUTES (ANESTHESIA): Performed by: OPHTHALMOLOGY

## 2024-12-09 PROCEDURE — V2632 POST CHMBR INTRAOCULAR LENS: HCPCS | Performed by: OPHTHALMOLOGY

## 2024-12-09 PROCEDURE — 3600000003 HC SURGERY LEVEL 3 BASE: Performed by: OPHTHALMOLOGY

## 2024-12-09 PROCEDURE — 7100000011 HC PHASE II RECOVERY - ADDTL 15 MIN: Performed by: OPHTHALMOLOGY

## 2024-12-09 PROCEDURE — 6360000002 HC RX W HCPCS: Performed by: OPHTHALMOLOGY

## 2024-12-09 PROCEDURE — 2720000010 HC SURG SUPPLY STERILE: Performed by: OPHTHALMOLOGY

## 2024-12-09 PROCEDURE — 2580000003 HC RX 258: Performed by: OPHTHALMOLOGY

## 2024-12-09 DEVICE — IMPLANTABLE DEVICE: Type: IMPLANTABLE DEVICE | Status: FUNCTIONAL

## 2024-12-09 RX ORDER — HYDROXYCHLOROQUINE SULFATE 200 MG/1
300 TABLET, FILM COATED ORAL DAILY
Qty: 135 TABLET | Refills: 1 | OUTPATIENT
Start: 2024-12-09

## 2024-12-09 RX ORDER — METHOTREXATE 2.5 MG/1
TABLET ORAL
Qty: 32 TABLET | Refills: 0 | OUTPATIENT
Start: 2024-12-09

## 2024-12-09 RX ORDER — BALANCED SALT SOLUTION ENRICHED WITH BICARBONATE, DEXTROSE, AND GLUTATHIONE
KIT INTRAOCULAR PRN
Status: DISCONTINUED | OUTPATIENT
Start: 2024-12-09 | End: 2024-12-09 | Stop reason: ALTCHOICE

## 2024-12-09 RX ORDER — BUPIVACAINE HYDROCHLORIDE 7.5 MG/ML
1 INJECTION, SOLUTION EPIDURAL; RETROBULBAR EVERY 5 MIN PRN
Status: COMPLETED | OUTPATIENT
Start: 2024-12-09 | End: 2024-12-09

## 2024-12-09 RX ORDER — FOLIC ACID 1 MG/1
1 TABLET ORAL DAILY
Qty: 90 TABLET | Refills: 1 | OUTPATIENT
Start: 2024-12-09

## 2024-12-09 RX ORDER — LIDOCAINE HYDROCHLORIDE 10 MG/ML
INJECTION, SOLUTION EPIDURAL; INFILTRATION; INTRACAUDAL; PERINEURAL PRN
Status: DISCONTINUED | OUTPATIENT
Start: 2024-12-09 | End: 2024-12-09 | Stop reason: ALTCHOICE

## 2024-12-09 RX ORDER — SODIUM CHLORIDE 9 MG/ML
INJECTION, SOLUTION INTRAVENOUS CONTINUOUS
Status: DISCONTINUED | OUTPATIENT
Start: 2024-12-09 | End: 2024-12-09 | Stop reason: HOSPADM

## 2024-12-09 RX ORDER — MIDAZOLAM HYDROCHLORIDE 1 MG/ML
INJECTION, SOLUTION INTRAMUSCULAR; INTRAVENOUS
Status: DISCONTINUED | OUTPATIENT
Start: 2024-12-09 | End: 2024-12-09 | Stop reason: SDUPTHER

## 2024-12-09 RX ADMIN — BUPIVACAINE HYDROCHLORIDE 0.38 MG: 7.5 INJECTION, SOLUTION EPIDURAL; RETROBULBAR at 06:53

## 2024-12-09 RX ADMIN — Medication 1 DROP: at 07:02

## 2024-12-09 RX ADMIN — Medication 1 DROP: at 07:10

## 2024-12-09 RX ADMIN — BUPIVACAINE HYDROCHLORIDE 0.38 MG: 7.5 INJECTION, SOLUTION EPIDURAL; RETROBULBAR at 07:01

## 2024-12-09 RX ADMIN — SODIUM CHLORIDE: 9 INJECTION, SOLUTION INTRAVENOUS at 07:24

## 2024-12-09 RX ADMIN — BUPIVACAINE HYDROCHLORIDE 0.38 MG: 7.5 INJECTION, SOLUTION EPIDURAL; RETROBULBAR at 07:10

## 2024-12-09 RX ADMIN — Medication 1 DROP: at 06:53

## 2024-12-09 RX ADMIN — MIDAZOLAM 1 MG: 1 INJECTION INTRAMUSCULAR; INTRAVENOUS at 07:27

## 2024-12-09 ASSESSMENT — PAIN - FUNCTIONAL ASSESSMENT
PAIN_FUNCTIONAL_ASSESSMENT: NONE - DENIES PAIN
PAIN_FUNCTIONAL_ASSESSMENT: 0-10

## 2024-12-09 NOTE — H&P
I have examined the patient and reviewed the H&P / Consult and there are no changes to the patient.    Chilo Barfield MD 12/9/20247:18 AM

## 2024-12-09 NOTE — OP NOTE
[facility]  RECORD OF OPERATION                       2024    Patient: Viktoriya Andersen  : 1970  Acct#: [id number]    PRE-OPERATIVE DIAGNOSIS:  Cataract, OS, Small pupil    POST-OPERATIVE DIAGNOSIS:  same    OPERATION PERFORMED:  Phacoemulsification cataract extraction with Vision blue and posterior chamber intraocular lens, OS    MODIFIERS: Complex    SURGEON:  Chilo Barfield MD    ANESTHESIA:  Topical/MAC    COMPLICATIONS:  None    LENS INFORMATION: MA60AC +24.0 (SN:[id number])    CDE: 3.07 (11.07 on machine with handpiece replacement after ~8.45)    INDICATION AND CONSENT:  The patient was found to have a visually significant cataract affecting their activities of daily living which is not adequately correctable by a change in spectacles.  The risks and options of cataract surgery including observation were discussed.  Consent was obtained and the patient requests to proceed.    OPERATIVE TECHNIQUE: In the preoperative area, the patient was prepared for surgery including dilation of the operative eye.  The patient was then taken to the operating room, the operative eye was prepped and draped in the usual sterile ophthalmic fashion.  A final timeout was performed to confirm the correct patient, site, side, lens, and procedure.  A lid speculum was inserted.  A paracentesis incision was made at the limbus in a position comfortable for the non-dominate hand.  0.2-0.4cc of 1% lidocaine was instilled into the anterior chamber followed by 0.2-0.4cc of Omidria. Viscoelastic was instilled into the anterior chamber and vision blue was painted on the capsular surface.  This was removed with Viscoat and then a keratome was used to produce a clear corneal incision at the temporal limbus.  A capsulorrhexis-type capsulotomy was performed. The lens nucleus was hydrodissected with balanced salt solution (BSS) and was phacoemulsified. The lens cortical material was aspirated

## 2024-12-09 NOTE — ANESTHESIA PRE PROCEDURE
Department of Anesthesiology  Preprocedure Note       Name:  Viktoriya Andersen   Age:  54 y.o.  :  1970                                          MRN:  [id number]         Date:  2024      Surgeon: Surgeon(s):  Chilo Barfield MD    Procedure: Procedure(s):  LEFT EYE CATARACT EMULSIFICATION INTRAOCULAR LENS IMPLANT    Medications prior to admission:   Prior to Admission medications    Medication Sig Start Date End Date Taking? Authorizing Provider   levothyroxine (SYNTHROID) 137 MCG tablet Take 1 tablet by mouth Daily   Yes Bam Sánchez MD   methotrexate (RHEUMATREX) 2.5 MG chemo tablet TAKE 4 TABLETS BY MOUTH IN THE MORNING AND 4 IN THE EVENING ONCE A WEEK 10/22/24  Yes Lydia Fontaine APRN - CNP   hydroxychloroquine (PLAQUENIL) 200 MG tablet Take 1.5 tablets by mouth daily 24  Yes Lydia Fontaine APRN - CNP   folic acid (FOLVITE) 1 MG tablet Take 1 tablet by mouth daily 24  Yes Lydia Fontaine APRN - CNP   naproxen (NAPROSYN) 500 MG tablet Take 1 tablet by mouth 2 times daily as needed for Pain 24  Yes Lydia Fontaine APRN - CNP   buPROPion (WELLBUTRIN XL) 300 MG XL tablet Take 1 tablet by mouth every morning   Yes Bam Sánchez MD   Multiple Vitamins-Minerals (MULTIVITAMIN PO) Take by mouth daily    Yes Bam Sánchez MD   Probiotic Product (PROBIOTIC PO) Take  by mouth daily.   Yes Bam Sánchez MD   triamcinolone (KENALOG) 0.1 % cream Apply topically 2 times daily. 23   Octavia Cabrera DO       Current medications:    Current Facility-Administered Medications   Medication Dose Route Frequency Provider Last Rate Last Admin   • 0.9 % sodium chloride infusion   IntraVENous Continuous Chilo Barfield MD       • balanced salts plus (BSS) ophthalmic solution    PRN Chilo Barfield MD       • Phenylephrine-Ketorolac 1-0.3 % 3.5 mL in balanced salts plus (BSS) 500 mL    PRN Chilo Barfield MD   150 mL at 24 0713   • sod

## 2024-12-09 NOTE — ANESTHESIA POSTPROCEDURE EVALUATION
Department of Anesthesiology  Postprocedure Note    Patient: Viktoriya Andersen  MRN: [id number]  YOB: 1970  Date of evaluation: 12/9/2024    Procedure Summary       Date: 12/09/24 Room / Location: [address]    Anesthesia Start: 0724 Anesthesia Stop: 0812    Procedure: LEFT EYE CATARACT EMULSIFICATION INTRAOCULAR LENS IMPLANT (Left) Diagnosis:       Combined forms of age-related cataract of left eye      (Combined forms of age-related cataract of left eye [H25.812])    Surgeons: Chilo Barfield MD Responsible Provider: Jay Agrawal MD    Anesthesia Type: MAC ASA Status: 2            Anesthesia Type: No value filed.    Van Phase I:      Van Phase II:      Anesthesia Post Evaluation    Patient location during evaluation: bedside  Patient participation: complete - patient participated  Level of consciousness: awake  Pain score: 0  Airway patency: patent  Nausea & Vomiting: no nausea and no vomiting  Cardiovascular status: blood pressure returned to baseline  Respiratory status: acceptable  Hydration status: stable  Pain management: adequate        No notable events documented.

## 2024-12-09 NOTE — PROGRESS NOTES
Proparacaine: 1 drop at start of case  Moxifloxacin: 1 drop at end of case  Prolensa: 1 drop at end of case  Prednisolone Acetate: 1 Drop at end of case  Omidria: 1-4 mixture with BSS-0.1ml      CDE: 11.07    Eye patch secured to left eye.

## 2024-12-09 NOTE — PROGRESS NOTES
0817-Patient to Phase II in cart. Report received from Gauri BRANHAM. Patient awake and alert. Vitals obtained and stable. Respirations even and unlabored on room air. Patient denies pain and nausea. Eye patch in place over left eye. Patient states she has minimal pain. Family in room.  0820-Patient provided with snack and drink. Denies needs. Call light remains in reach.  0835-Discharge instructions reviewed. Verbalized understanding. IV removed with no complications. Patient getting dressed at bedside.  0845-Patient meets discharge criteria.  Discharged in stable condition with responsible . All belongings given to patient. Patient ambulated to car with assistance from RN. Patient tolerated well.

## 2025-01-17 ENCOUNTER — PATIENT MESSAGE (OUTPATIENT)
Age: 55
End: 2025-01-17

## 2025-01-17 DIAGNOSIS — Z51.81 MEDICATION MONITORING ENCOUNTER: Primary | ICD-10-CM

## 2025-01-23 DIAGNOSIS — Z87.39 H/O RHEUMATOID ARTHRITIS: ICD-10-CM

## 2025-01-23 LAB
ALBUMIN: 4.3 G/DL (ref 3.5–5.2)
ALK PHOSPHATASE: 71 U/L (ref 30–111)
ALT SERPL-CCNC: 16 U/L (ref 5–33)
ANION GAP SERPL CALCULATED.3IONS-SCNC: 9 MMOL/L (ref 7–16)
AST SERPL-CCNC: 23 U/L (ref 9–40)
BASOPHILS ABSOLUTE: 0.07 K/UL (ref 0–0.2)
BASOPHILS RELATIVE PERCENT: 1.2 % (ref 0–2)
BILIRUB SERPL-MCNC: 0.4 MG/DL
BUN BLDV-MCNC: 28 MG/DL (ref 6–20)
C-REACTIVE PROTEIN: 0.1 MG/DL
CALCIUM SERPL-MCNC: 9.4 MG/DL (ref 8.6–10.5)
CHLORIDE BLD-SCNC: 105 MMOL/L (ref 96–107)
CO2: 29 MMOL/L (ref 18–32)
CREAT SERPL-MCNC: 0.89 MG/DL (ref 0.51–1.15)
EGFR IF NONAFRICAN AMERICAN: 77 ML/MIN/1.73M2
EOSINOPHILS ABSOLUTE: 0.27 K/UL (ref 0–0.8)
EOSINOPHILS RELATIVE PERCENT: 4.6 % (ref 0–5)
GLUCOSE: 91 MG/DL (ref 65–125)
HCT VFR BLD CALC: 36.6 % (ref 35–47)
HEMOGLOBIN: 12.1 G/DL (ref 11.9–16)
IMMATURE GRANS (ABS): 0.02 K/UL (ref 0–0.06)
IMMATURE GRANULOCYTES %: 0.3 % (ref 0–2)
LYMPHOCYTES ABSOLUTE: 1.6 K/UL (ref 0.9–5.2)
LYMPHOCYTES RELATIVE PERCENT: 27.4 % (ref 20–45)
MCH RBC QN AUTO: 30.5 PG (ref 26–33)
MCHC RBC AUTO-ENTMCNC: 33.1 G/DL (ref 32–35)
MCV RBC AUTO: 92 FL (ref 75–100)
MONOCYTES ABSOLUTE: 0.62 K/UL (ref 0.1–1)
MONOCYTES RELATIVE PERCENT: 10.6 % (ref 0–13)
NEUTROPHILS ABSOLUTE: 3.27 K/UL (ref 1.9–8)
NEUTROPHILS RELATIVE PERCENT: 55.9 % (ref 45–75)
PDW BLD-RTO: 13.2 % (ref 11.2–14.8)
PLATELET # BLD: 239 THOUS/CMM (ref 140–440)
POTASSIUM SERPL-SCNC: 4.1 MMOL/L (ref 3.5–5.4)
RBC # BLD: 3.97 MILL/CMM (ref 3.8–5.2)
SED RATE, AUTOMATED: 5 MM/HR (ref 0–29)
SODIUM BLD-SCNC: 143 MMOL/L (ref 135–148)
TOTAL PROTEIN: 6.6 G/DL (ref 6–8.3)
WBC # BLD: 5.9 THDS/CMM (ref 3.6–11)

## 2025-01-23 RX ORDER — FOLIC ACID 1 MG/1
1 TABLET ORAL DAILY
Qty: 90 TABLET | Refills: 1 | Status: SHIPPED | OUTPATIENT
Start: 2025-01-23

## 2025-01-23 RX ORDER — METHOTREXATE 2.5 MG/1
TABLET ORAL
Qty: 32 TABLET | Refills: 2 | Status: SHIPPED | OUTPATIENT
Start: 2025-01-23

## 2025-04-17 ENCOUNTER — PATIENT MESSAGE (OUTPATIENT)
Age: 55
End: 2025-04-17

## 2025-05-08 ENCOUNTER — RESULTS FOLLOW-UP (OUTPATIENT)
Age: 55
End: 2025-05-08

## 2025-05-08 DIAGNOSIS — Z87.39 H/O RHEUMATOID ARTHRITIS: ICD-10-CM

## 2025-05-08 LAB
ALBUMIN: 4.2 G/DL (ref 3.5–5.2)
ALK PHOSPHATASE: 80 U/L (ref 30–111)
ALT SERPL-CCNC: 14 U/L (ref 5–33)
ANION GAP SERPL CALCULATED.3IONS-SCNC: 10 MMOL/L (ref 7–16)
AST SERPL-CCNC: 18 U/L (ref 9–40)
BASOPHILS ABSOLUTE: 0.07 K/UL (ref 0–0.2)
BASOPHILS RELATIVE PERCENT: 1.7 % (ref 0–2)
BILIRUB SERPL-MCNC: 0.4 MG/DL
BUN BLDV-MCNC: 25 MG/DL (ref 6–20)
C-REACTIVE PROTEIN: 0.1 MG/DL
CALCIUM SERPL-MCNC: 8.8 MG/DL (ref 8.6–10.5)
CHLORIDE BLD-SCNC: 107 MMOL/L (ref 96–107)
CO2: 26 MMOL/L (ref 18–32)
CREAT SERPL-MCNC: 0.85 MG/DL (ref 0.51–1.15)
EGFR IF NONAFRICAN AMERICAN: 80 ML/MIN/1.73M2
EOSINOPHILS ABSOLUTE: 0.3 K/UL (ref 0–0.8)
EOSINOPHILS RELATIVE PERCENT: 7.3 % (ref 0–5)
GLUCOSE: 68 MG/DL (ref 70–100)
HCT VFR BLD CALC: 36.5 % (ref 35–47)
HEMOGLOBIN: 11.8 G/DL (ref 11.9–16)
IMMATURE GRANS (ABS): 0.01 K/UL (ref 0–0.06)
IMMATURE GRANULOCYTES %: 0.2 % (ref 0–2)
LYMPHOCYTES ABSOLUTE: 1.02 K/UL (ref 0.9–5.2)
LYMPHOCYTES RELATIVE PERCENT: 24.9 % (ref 20–45)
MCH RBC QN AUTO: 30.3 PG (ref 26–33)
MCHC RBC AUTO-ENTMCNC: 32.3 G/DL (ref 32–35)
MCV RBC AUTO: 94 FL (ref 75–100)
MONOCYTES ABSOLUTE: 0.41 K/UL (ref 0.1–1)
MONOCYTES RELATIVE PERCENT: 10 % (ref 0–13)
NEUTROPHILS ABSOLUTE: 2.28 K/UL (ref 1.9–8)
NEUTROPHILS RELATIVE PERCENT: 55.9 % (ref 45–75)
PDW BLD-RTO: 12.3 % (ref 11.2–14.8)
PLATELET # BLD: 201 THOUS/CMM (ref 140–440)
POTASSIUM SERPL-SCNC: 4 MMOL/L (ref 3.5–5.4)
RBC # BLD: 3.89 MILL/CMM (ref 3.8–5.2)
SED RATE, AUTOMATED: 3 MM/HR (ref 0–29)
SODIUM BLD-SCNC: 143 MMOL/L (ref 135–148)
TOTAL PROTEIN: 6.2 G/DL (ref 6–8.3)
WBC # BLD: 4.1 THDS/CMM (ref 3.6–11)

## 2025-05-08 RX ORDER — HYDROXYCHLOROQUINE SULFATE 200 MG/1
300 TABLET, FILM COATED ORAL DAILY
Qty: 135 TABLET | Refills: 1 | Status: SHIPPED | OUTPATIENT
Start: 2025-05-08

## 2025-05-08 RX ORDER — FOLIC ACID 1 MG/1
1 TABLET ORAL DAILY
Qty: 90 TABLET | Refills: 1 | Status: SHIPPED | OUTPATIENT
Start: 2025-05-08

## 2025-05-08 RX ORDER — METHOTREXATE 2.5 MG/1
TABLET ORAL
Qty: 32 TABLET | Refills: 2 | Status: SHIPPED | OUTPATIENT
Start: 2025-05-08

## 2025-06-09 ENCOUNTER — PATIENT MESSAGE (OUTPATIENT)
Age: 55
End: 2025-06-09

## 2025-07-08 ENCOUNTER — OFFICE VISIT (OUTPATIENT)
Age: 55
End: 2025-07-08
Payer: COMMERCIAL

## 2025-07-08 VITALS
HEART RATE: 66 BPM | OXYGEN SATURATION: 100 % | WEIGHT: 127.1 LBS | DIASTOLIC BLOOD PRESSURE: 78 MMHG | HEIGHT: 67 IN | SYSTOLIC BLOOD PRESSURE: 128 MMHG | BODY MASS INDEX: 19.95 KG/M2

## 2025-07-08 DIAGNOSIS — M17.12 OSTEOARTHRITIS OF LEFT PATELLOFEMORAL JOINT: ICD-10-CM

## 2025-07-08 DIAGNOSIS — M79.604 RIGHT LEG PAIN: ICD-10-CM

## 2025-07-08 DIAGNOSIS — I73.00 RAYNAUD'S DISEASE WITHOUT GANGRENE: ICD-10-CM

## 2025-07-08 DIAGNOSIS — Z87.39 H/O RHEUMATOID ARTHRITIS: Primary | ICD-10-CM

## 2025-07-08 DIAGNOSIS — Z51.81 MEDICATION MONITORING ENCOUNTER: ICD-10-CM

## 2025-07-08 PROCEDURE — 99214 OFFICE O/P EST MOD 30 MIN: CPT | Performed by: INTERNAL MEDICINE

## 2025-07-08 ASSESSMENT — ENCOUNTER SYMPTOMS
EYE ITCHING: 0
CONSTIPATION: 0
SHORTNESS OF BREATH: 0
TROUBLE SWALLOWING: 0
COUGH: 0
DIARRHEA: 0
ABDOMINAL PAIN: 0
EYE PAIN: 0
BACK PAIN: 0
NAUSEA: 0

## 2025-07-08 NOTE — PROGRESS NOTES
Content: Thought content normal.       upper extremities - Non-tender, No swelling     Lower extremities:Hip - Right hip pain with rotation , tender right outer hip     Spine - Non-tender, negative SLR, ministerio testing          LABS    CBC  Lab Results   Component Value Date/Time    WBC 4.1 05/07/2025 07:57 AM    RBC 3.89 05/07/2025 07:57 AM    RBC 3.96 01/09/2024 04:23 PM    HGB 11.8 05/07/2025 07:57 AM    HCT 36.5 05/07/2025 07:57 AM    MCV 94 05/07/2025 07:57 AM    MCH 30.3 05/07/2025 07:57 AM    MCHC 32.3 05/07/2025 07:57 AM    RDW 12.3 05/07/2025 07:57 AM     05/07/2025 07:57 AM    MPV 11.7 09/18/2024 07:30 AM       CMP  Lab Results   Component Value Date/Time    CALCIUM 8.8 05/07/2025 07:57 AM     05/07/2025 07:57 AM    K 4.0 05/07/2025 07:57 AM    CO2 26 05/07/2025 07:57 AM     05/07/2025 07:57 AM    BUN 25 05/07/2025 07:57 AM    CREATININE 0.85 05/07/2025 07:57 AM    ALKPHOS 80 05/07/2025 07:57 AM    ALKPHOS 93 01/03/2023 12:00 AM    ALT 14 05/07/2025 07:57 AM    AST 18 05/07/2025 07:57 AM    BILIDIR <0.2 12/08/2019 12:22 AM       HgBA1c: No components found for: \"HGBA1C\"    Lab Results   Component Value Date/Time    VITD25 42 09/14/2023 04:12 PM         No results found for: \"ANASCRN\"  No results found for: \"SSA\"  No results found for: \"SSB\"  No results found for: \"ANTI-SMITH\"  No results found for: \"DSDNAAB\"   No results found for: \"ANTIRNP\"  No results found for: \"C3\", \"C4\"  No results found for: \"CCPAB\"  Lab Results   Component Value Date    RF 11 12/28/2020       No components found for: \"CANCASCRN\", \"APANCASCRN\"  Lab Results   Component Value Date    SEDRATE 3 05/07/2025     Lab Results   Component Value Date    CRP 0.1 05/07/2025       RADIOLOGY:         ASSESSMENT/PLAN    Assessment   Plan     Seronegative rheumatoid arthritis - stable no flare.   H/O psoriatic arthritis  - joint pain and swelling bilateral hands and wrists in 2005 which progressed to generalized joint pains and

## 2025-07-20 ENCOUNTER — HOSPITAL ENCOUNTER (EMERGENCY)
Age: 55
Discharge: HOME | End: 2025-07-20
Payer: COMMERCIAL

## 2025-07-20 VITALS
RESPIRATION RATE: 16 BRPM | HEIGHT: 67 IN | DIASTOLIC BLOOD PRESSURE: 92 MMHG | WEIGHT: 130 LBS | BODY MASS INDEX: 20.4 KG/M2 | TEMPERATURE: 98.6 F | SYSTOLIC BLOOD PRESSURE: 147 MMHG | OXYGEN SATURATION: 100 % | HEART RATE: 78 BPM

## 2025-07-20 DIAGNOSIS — L25.9 CONTACT DERMATITIS, UNSPECIFIED CONTACT DERMATITIS TYPE, UNSPECIFIED TRIGGER: Primary | ICD-10-CM

## 2025-07-20 PROCEDURE — 6360000002 HC RX W HCPCS: Performed by: NURSE PRACTITIONER

## 2025-07-20 PROCEDURE — 99213 OFFICE O/P EST LOW 20 MIN: CPT

## 2025-07-20 PROCEDURE — 99213 OFFICE O/P EST LOW 20 MIN: CPT | Performed by: NURSE PRACTITIONER

## 2025-07-20 PROCEDURE — 96372 THER/PROPH/DIAG INJ SC/IM: CPT

## 2025-07-20 RX ORDER — HYDROXYZINE HYDROCHLORIDE 25 MG/1
25 TABLET, FILM COATED ORAL EVERY 8 HOURS PRN
Qty: 30 TABLET | Refills: 0 | Status: SHIPPED | OUTPATIENT
Start: 2025-07-20 | End: 2025-07-30

## 2025-07-20 RX ORDER — METHYLPREDNISOLONE ACETATE 80 MG/ML
80 INJECTION, SUSPENSION INTRA-ARTICULAR; INTRALESIONAL; INTRAMUSCULAR; SOFT TISSUE ONCE
Status: COMPLETED | OUTPATIENT
Start: 2025-07-20 | End: 2025-07-20

## 2025-07-20 RX ORDER — PREDNISONE 10 MG/1
TABLET ORAL
Qty: 30 TABLET | Refills: 0 | Status: SHIPPED | OUTPATIENT
Start: 2025-07-20

## 2025-07-20 RX ORDER — TRIAMCINOLONE ACETONIDE 0.25 MG/G
OINTMENT TOPICAL
Qty: 80 G | Refills: 0 | Status: SHIPPED | OUTPATIENT
Start: 2025-07-20 | End: 2025-07-27

## 2025-07-20 RX ORDER — HYDROCORTISONE 25 MG/G
CREAM TOPICAL 2 TIMES DAILY
COMMUNITY

## 2025-07-20 RX ORDER — DIPHENHYDRAMINE HCL 25 MG
25 CAPSULE ORAL EVERY 6 HOURS PRN
COMMUNITY
End: 2025-07-20 | Stop reason: ALTCHOICE

## 2025-07-20 RX ADMIN — METHYLPREDNISOLONE ACETATE 80 MG: 80 INJECTION, SUSPENSION INTRA-ARTICULAR; INTRALESIONAL; INTRAMUSCULAR; SOFT TISSUE at 10:44

## 2025-07-20 ASSESSMENT — PAIN - FUNCTIONAL ASSESSMENT
PAIN_FUNCTIONAL_ASSESSMENT: 0-10
PAIN_FUNCTIONAL_ASSESSMENT: ACTIVITIES ARE NOT PREVENTED

## 2025-07-20 ASSESSMENT — PAIN DESCRIPTION - ONSET: ONSET: PROGRESSIVE

## 2025-07-20 ASSESSMENT — PAIN DESCRIPTION - DESCRIPTORS: DESCRIPTORS: DISCOMFORT

## 2025-07-20 ASSESSMENT — PAIN DESCRIPTION - PAIN TYPE: TYPE: ACUTE PAIN

## 2025-07-20 ASSESSMENT — PAIN DESCRIPTION - FREQUENCY: FREQUENCY: CONTINUOUS

## 2025-07-20 ASSESSMENT — PAIN SCALES - GENERAL: PAINLEVEL_OUTOF10: 9

## 2025-07-20 NOTE — ED TRIAGE NOTES
Pt to urgent care due to a rash that is spreading and causing irritation. The rash started on her ankles and is now on her hands, face, neck and on her nose. The spot on her nose is seeping according the patient.

## 2025-07-20 NOTE — ED PROVIDER NOTES
[facility]  Urgent Care Encounter      CHIEF COMPLAINT       Chief Complaint   Patient presents with    Rash     Hands, feet/ankles mouth, arms and neck        Nurses Notes reviewed and I agree except as noted in the HPI.  HISTORY OF PRESENT ILLNESS   Viktoriya Andersen is a 55 y.o. female who presents for evaluation of rash.    Onset of symptoms greater than 2 days ago, worsening.  Rash present to the face, neck, bilateral upper/lower extremities.    Associated pruritus.  Patient notes pulling weeds with her daughter on Wednesday.  She also notes her daughter has a rash, but unsure if they are similar.    No fever.    No improvement with over-the-counter medication.    REVIEW OF SYSTEMS     Review of Systems   Constitutional:  Negative for chills, diaphoresis, fatigue and fever.   HENT:  Negative for trouble swallowing.    Respiratory:  Negative for shortness of breath.    Musculoskeletal:  Negative for neck pain and neck stiffness.   Skin:  Positive for rash.   Neurological:  Negative for headaches.   Hematological:  Negative for adenopathy.       PAST MEDICAL HISTORY         Diagnosis Date    Depression     Graves disease     Hypothyroidism     Raynaud disease        SURGICAL HISTORY     Patient  has a past surgical history that includes Endometrial ablation; Breast biopsy (Left, 2009); parathyroidectomy (03/15/2024); and Eye surgery (Left, 12/9/2024).    CURRENT MEDICATIONS       Discharge Medication List as of 7/20/2025 10:52 AM        CONTINUE these medications which have NOT CHANGED    Details   hydrocortisone 2.5 % cream Apply topically 2 times daily Apply topically 2 times daily., Topical, 2 TIMES DAILY, Historical Med      methotrexate (RHEUMATREX) 2.5 MG chemo tablet TAKE 4 TABLETS BY MOUTH IN THE MORNING AND 4 IN THE EVENING ONCE A WEEK, Disp-32 tablet, R-2Normal      hydroxychloroquine (PLAQUENIL) 200 MG tablet Take 1.5 tablets by mouth daily, Disp-135 tablet, R-1Normal      folic acid

## 2025-08-11 DIAGNOSIS — Z87.39 H/O RHEUMATOID ARTHRITIS: ICD-10-CM

## 2025-08-11 RX ORDER — METHOTREXATE 2.5 MG/1
TABLET ORAL
Qty: 32 TABLET | Refills: 0 | OUTPATIENT
Start: 2025-08-11

## 2025-08-13 LAB
ALBUMIN: 3.9 G/DL (ref 3.5–5.2)
ALK PHOSPHATASE: 94 U/L (ref 30–111)
ALT SERPL-CCNC: 14 U/L (ref 5–33)
ANION GAP SERPL CALCULATED.3IONS-SCNC: 12 MMOL/L (ref 7–16)
AST SERPL-CCNC: 18 U/L (ref 9–40)
BASOPHILS ABSOLUTE: 0.08 K/UL (ref 0–0.2)
BASOPHILS RELATIVE PERCENT: 1.5 % (ref 0–2)
BILIRUB SERPL-MCNC: 0.5 MG/DL
BUN BLDV-MCNC: 23 MG/DL (ref 6–20)
C-REACTIVE PROTEIN: 0.2 MG/DL
CALCIUM SERPL-MCNC: 8.9 MG/DL (ref 8.6–10.5)
CHLORIDE BLD-SCNC: 106 MMOL/L (ref 96–107)
CO2: 23 MMOL/L (ref 18–32)
CREAT SERPL-MCNC: 1.2 MG/DL (ref 0.51–1.15)
EGFR IF NONAFRICAN AMERICAN: 53 ML/MIN/1.73M2
EOSINOPHILS ABSOLUTE: 0.41 K/UL (ref 0–0.8)
EOSINOPHILS RELATIVE PERCENT: 7.8 % (ref 0–5)
GLUCOSE: 84 MG/DL (ref 70–100)
HCT VFR BLD CALC: 36.9 % (ref 35–47)
HEMOGLOBIN: 11.6 G/DL (ref 11.9–16)
IMMATURE GRANS (ABS): 0.02 K/UL (ref 0–0.06)
IMMATURE GRANULOCYTES %: 0.4 % (ref 0–2)
LYMPHOCYTES ABSOLUTE: 0.87 K/UL (ref 0.9–5.2)
LYMPHOCYTES RELATIVE PERCENT: 16.5 % (ref 20–45)
MCH RBC QN AUTO: 29.7 PG (ref 26–33)
MCHC RBC AUTO-ENTMCNC: 31.4 G/DL (ref 32–35)
MCV RBC AUTO: 95 FL (ref 75–100)
MONOCYTES ABSOLUTE: 0.41 K/UL (ref 0.1–1)
MONOCYTES RELATIVE PERCENT: 7.8 % (ref 0–13)
NEUTROPHILS ABSOLUTE: 3.48 K/UL (ref 1.9–8)
NEUTROPHILS RELATIVE PERCENT: 66 % (ref 45–75)
PDW BLD-RTO: 13.6 % (ref 11.2–14.8)
PLATELET # BLD: 215 THOUS/CMM (ref 140–440)
POTASSIUM SERPL-SCNC: 4.2 MMOL/L (ref 3.5–5.4)
RBC # BLD: 3.9 MILL/CMM (ref 3.8–5.2)
SED RATE, AUTOMATED: 11 MM/HR (ref 0–29)
SODIUM BLD-SCNC: 141 MMOL/L (ref 135–148)
TOTAL PROTEIN: 6.2 G/DL (ref 6–8.3)
WBC # BLD: 5.3 THDS/CMM (ref 3.6–11)

## 2025-08-14 ENCOUNTER — TELEPHONE (OUTPATIENT)
Age: 55
End: 2025-08-14

## 2025-08-19 LAB
APPEARANCE: CLEAR
BACTERIA: ABNORMAL
BILIRUB SERPL-MCNC: NEGATIVE MG/DL
COLOR, UA: YELLOW
CREATINE, URINE: 76.2 MG/DL
EPITHELIAL CELLS: ABNORMAL HPF
GLUCOSE BLD-MCNC: NEGATIVE MG/DL
HYALINE CASTS: ABNORMAL LPF
KETONES, URINE: NEGATIVE
LEUKOCYTE ESTERASE, URINE: ABNORMAL
NITRITE, URINE: NEGATIVE
OCCULT BLOOD,URINE: NEGATIVE
PH: 7.5 (ref 5–8)
PROTEIN, URINE: 7.6 MG/DL
PROTEIN, URINE: NEGATIVE
PROTEIN/CREAT RATIO: 0.1 G/G (ref 0–0.2)
RBC # BLD: ABNORMAL HPF (ref 0–2)
SODIUM URINE: 222 MMOL/L
SP GRAVITY MISCELLANEOUS: 1.02 (ref 1–1.03)
UROBILINOGEN, URINE: 0.2 MG/DL
WBC # BLD: ABNORMAL HPF (ref 0–5)

## 2025-08-20 LAB — URINE CULTURE, ROUTINE: NORMAL

## 2025-08-27 DIAGNOSIS — Z87.39 H/O RHEUMATOID ARTHRITIS: ICD-10-CM

## 2025-08-27 RX ORDER — HYDROXYCHLOROQUINE SULFATE 200 MG/1
300 TABLET, FILM COATED ORAL DAILY
Qty: 135 TABLET | Refills: 1 | Status: SHIPPED | OUTPATIENT
Start: 2025-08-27

## 2025-08-27 RX ORDER — METHOTREXATE 2.5 MG/1
TABLET ORAL
Qty: 32 TABLET | Refills: 2 | Status: SHIPPED | OUTPATIENT
Start: 2025-08-27

## 2025-08-28 ENCOUNTER — PATIENT MESSAGE (OUTPATIENT)
Age: 55
End: 2025-08-28

## 2025-08-28 DIAGNOSIS — R79.89 ELEVATED SERUM CREATININE: Primary | ICD-10-CM

## (undated) DEVICE — EYE PAK* 1040 PLASTIC OPHTHALMIC DRAPE INCISE POUCH: Brand: ALCON EYE-PAK

## (undated) DEVICE — MICROSURGICAL INSTRUMENT HYDRODISSECTION CANNULA 25GA, 8MM BEND: Brand: ALCON

## (undated) DEVICE — DRESSING TRNSPAR W2XL2.75IN FLM SHT SEMIPERMEABLE WIND

## (undated) DEVICE — GLOVE SURG 7.5 11.7IN BEAD CUF LIGHT BRN SENSICARE LTX FREE

## (undated) DEVICE — SYRINGE, LUER SLIP, STERILE, 1ML: Brand: MEDLINE

## (undated) DEVICE — CLEARCUT® HP2 SLIT KNIFE INTREPID MICRO-COAXIAL SYSTEM 2.4 DB: Brand: CLEARCUT®; INTREPID

## (undated) DEVICE — MARKER,SKIN,WI/RULER AND LABELS: Brand: MEDLINE

## (undated) DEVICE — CLEARCUT® SIDEPORT KNIFE DUAL BEVEL 1.0MM ANGLED: Brand: CLEARCUT®

## (undated) DEVICE — LENS CLAREON IOL 24.5D: Type: IMPLANTABLE DEVICE | Status: NON-FUNCTIONAL

## (undated) DEVICE — SOLUTION BSS 15ML

## (undated) DEVICE — CATARACT PACK: Brand: MEDLINE INDUSTRIES, INC.

## (undated) DEVICE — Z INACTIVE USE 2735373 APPLICATOR FBR LAIN COT WOOD TIP ECONOMICAL

## (undated) DEVICE — BETADINE 5% EYE SOL

## (undated) DEVICE — MICROSURGICAL INSTRUMENT ANTERIOR CHAMBER CANNULA 27GA: Brand: ALCON

## (undated) DEVICE — PHACOEMULSIFICATION PACK COMPACT

## (undated) DEVICE — INTREPID® TRANSFORMER IA HP: Brand: INTREPID®